# Patient Record
Sex: FEMALE | Race: OTHER | HISPANIC OR LATINO | URBAN - METROPOLITAN AREA
[De-identification: names, ages, dates, MRNs, and addresses within clinical notes are randomized per-mention and may not be internally consistent; named-entity substitution may affect disease eponyms.]

---

## 2024-02-09 ENCOUNTER — EMERGENCY (EMERGENCY)
Facility: HOSPITAL | Age: 24
LOS: 1 days | Discharge: ANOTHER HOSPITAL | End: 2024-02-09
Attending: STUDENT IN AN ORGANIZED HEALTH CARE EDUCATION/TRAINING PROGRAM
Payer: MEDICAID

## 2024-02-09 ENCOUNTER — INPATIENT (INPATIENT)
Facility: HOSPITAL | Age: 24
LOS: 3 days | Discharge: HOME | End: 2024-02-13
Attending: STUDENT IN AN ORGANIZED HEALTH CARE EDUCATION/TRAINING PROGRAM | Admitting: STUDENT IN AN ORGANIZED HEALTH CARE EDUCATION/TRAINING PROGRAM
Payer: MEDICAID

## 2024-02-09 VITALS — RESPIRATION RATE: 17 BRPM | HEIGHT: 63 IN | TEMPERATURE: 98 F | WEIGHT: 128.09 LBS

## 2024-02-09 VITALS
TEMPERATURE: 98 F | HEART RATE: 64 BPM | SYSTOLIC BLOOD PRESSURE: 92 MMHG | DIASTOLIC BLOOD PRESSURE: 53 MMHG | RESPIRATION RATE: 18 BRPM | OXYGEN SATURATION: 97 %

## 2024-02-09 VITALS
OXYGEN SATURATION: 97 % | WEIGHT: 134.48 LBS | RESPIRATION RATE: 18 BRPM | TEMPERATURE: 99 F | DIASTOLIC BLOOD PRESSURE: 79 MMHG | SYSTOLIC BLOOD PRESSURE: 114 MMHG | HEART RATE: 83 BPM

## 2024-02-09 DIAGNOSIS — F31.9 BIPOLAR DISORDER, UNSPECIFIED: ICD-10-CM

## 2024-02-09 DIAGNOSIS — F33.2 MAJOR DEPRESSIVE DISORDER, RECURRENT SEVERE WITHOUT PSYCHOTIC FEATURES: ICD-10-CM

## 2024-02-09 DIAGNOSIS — F29 UNSPECIFIED PSYCHOSIS NOT DUE TO A SUBSTANCE OR KNOWN PHYSIOLOGICAL CONDITION: ICD-10-CM

## 2024-02-09 DIAGNOSIS — F12.988 CANNABIS USE, UNSPECIFIED WITH OTHER CANNABIS-INDUCED DISORDER: ICD-10-CM

## 2024-02-09 LAB
ALBUMIN SERPL ELPH-MCNC: 3.4 G/DL — LOW (ref 3.5–5)
ALP SERPL-CCNC: 84 U/L — SIGNIFICANT CHANGE UP (ref 40–120)
ALT FLD-CCNC: 21 U/L DA — SIGNIFICANT CHANGE UP (ref 10–60)
AMPHET UR-MCNC: NEGATIVE — SIGNIFICANT CHANGE UP
ANION GAP SERPL CALC-SCNC: 6 MMOL/L — SIGNIFICANT CHANGE UP (ref 5–17)
APAP SERPL-MCNC: <2 UG/ML — LOW (ref 10–30)
APPEARANCE UR: CLEAR — SIGNIFICANT CHANGE UP
AST SERPL-CCNC: 17 U/L — SIGNIFICANT CHANGE UP (ref 10–40)
BACTERIA # UR AUTO: ABNORMAL /HPF
BARBITURATES UR SCN-MCNC: NEGATIVE — SIGNIFICANT CHANGE UP
BASOPHILS # BLD AUTO: 0.03 K/UL — SIGNIFICANT CHANGE UP (ref 0–0.2)
BASOPHILS NFR BLD AUTO: 0.4 % — SIGNIFICANT CHANGE UP (ref 0–2)
BENZODIAZ UR-MCNC: NEGATIVE — SIGNIFICANT CHANGE UP
BILIRUB SERPL-MCNC: 0.2 MG/DL — SIGNIFICANT CHANGE UP (ref 0.2–1.2)
BILIRUB UR-MCNC: NEGATIVE — SIGNIFICANT CHANGE UP
BUN SERPL-MCNC: 10 MG/DL — SIGNIFICANT CHANGE UP (ref 7–18)
CALCIUM SERPL-MCNC: 9.1 MG/DL — SIGNIFICANT CHANGE UP (ref 8.4–10.5)
CHLORIDE SERPL-SCNC: 107 MMOL/L — SIGNIFICANT CHANGE UP (ref 96–108)
CO2 SERPL-SCNC: 26 MMOL/L — SIGNIFICANT CHANGE UP (ref 22–31)
COCAINE METAB.OTHER UR-MCNC: NEGATIVE — SIGNIFICANT CHANGE UP
COLOR SPEC: YELLOW — SIGNIFICANT CHANGE UP
CREAT SERPL-MCNC: 0.69 MG/DL — SIGNIFICANT CHANGE UP (ref 0.5–1.3)
DIFF PNL FLD: ABNORMAL
EGFR: 127 ML/MIN/1.73M2 — SIGNIFICANT CHANGE UP
EOSINOPHIL # BLD AUTO: 0.19 K/UL — SIGNIFICANT CHANGE UP (ref 0–0.5)
EOSINOPHIL NFR BLD AUTO: 2.3 % — SIGNIFICANT CHANGE UP (ref 0–6)
EPI CELLS # UR: PRESENT
ETHANOL SERPL-MCNC: 6 MG/DL — SIGNIFICANT CHANGE UP (ref 0–10)
FLUAV AG NPH QL: SIGNIFICANT CHANGE UP
FLUBV AG NPH QL: SIGNIFICANT CHANGE UP
GLUCOSE SERPL-MCNC: 96 MG/DL — SIGNIFICANT CHANGE UP (ref 70–99)
GLUCOSE UR QL: NEGATIVE MG/DL — SIGNIFICANT CHANGE UP
HCT VFR BLD CALC: 41.1 % — SIGNIFICANT CHANGE UP (ref 34.5–45)
HGB BLD-MCNC: 13.4 G/DL — SIGNIFICANT CHANGE UP (ref 11.5–15.5)
IMM GRANULOCYTES NFR BLD AUTO: 0.5 % — SIGNIFICANT CHANGE UP (ref 0–0.9)
KETONES UR-MCNC: NEGATIVE MG/DL — SIGNIFICANT CHANGE UP
LEUKOCYTE ESTERASE UR-ACNC: NEGATIVE — SIGNIFICANT CHANGE UP
LYMPHOCYTES # BLD AUTO: 3.21 K/UL — SIGNIFICANT CHANGE UP (ref 1–3.3)
LYMPHOCYTES # BLD AUTO: 38 % — SIGNIFICANT CHANGE UP (ref 13–44)
MCHC RBC-ENTMCNC: 29.1 PG — SIGNIFICANT CHANGE UP (ref 27–34)
MCHC RBC-ENTMCNC: 32.6 GM/DL — SIGNIFICANT CHANGE UP (ref 32–36)
MCV RBC AUTO: 89.2 FL — SIGNIFICANT CHANGE UP (ref 80–100)
METHADONE UR-MCNC: NEGATIVE — SIGNIFICANT CHANGE UP
MONOCYTES # BLD AUTO: 0.62 K/UL — SIGNIFICANT CHANGE UP (ref 0–0.9)
MONOCYTES NFR BLD AUTO: 7.3 % — SIGNIFICANT CHANGE UP (ref 2–14)
NEUTROPHILS # BLD AUTO: 4.35 K/UL — SIGNIFICANT CHANGE UP (ref 1.8–7.4)
NEUTROPHILS NFR BLD AUTO: 51.5 % — SIGNIFICANT CHANGE UP (ref 43–77)
NITRITE UR-MCNC: NEGATIVE — SIGNIFICANT CHANGE UP
NRBC # BLD: 0 /100 WBCS — SIGNIFICANT CHANGE UP (ref 0–0)
OPIATES UR-MCNC: NEGATIVE — SIGNIFICANT CHANGE UP
PCP SPEC-MCNC: SIGNIFICANT CHANGE UP
PCP UR-MCNC: NEGATIVE — SIGNIFICANT CHANGE UP
PH UR: 7 — SIGNIFICANT CHANGE UP (ref 5–8)
PLATELET # BLD AUTO: 226 K/UL — SIGNIFICANT CHANGE UP (ref 150–400)
POTASSIUM SERPL-MCNC: 3.6 MMOL/L — SIGNIFICANT CHANGE UP (ref 3.5–5.3)
POTASSIUM SERPL-SCNC: 3.6 MMOL/L — SIGNIFICANT CHANGE UP (ref 3.5–5.3)
PROT SERPL-MCNC: 7.3 G/DL — SIGNIFICANT CHANGE UP (ref 6–8.3)
PROT UR-MCNC: NEGATIVE MG/DL — SIGNIFICANT CHANGE UP
RBC # BLD: 4.61 M/UL — SIGNIFICANT CHANGE UP (ref 3.8–5.2)
RBC # FLD: 13 % — SIGNIFICANT CHANGE UP (ref 10.3–14.5)
RBC CASTS # UR COMP ASSIST: 2 /HPF — SIGNIFICANT CHANGE UP (ref 0–4)
SALICYLATES SERPL-MCNC: <1.7 MG/DL — LOW (ref 2.8–20)
SARS-COV-2 RNA SPEC QL NAA+PROBE: SIGNIFICANT CHANGE UP
SODIUM SERPL-SCNC: 139 MMOL/L — SIGNIFICANT CHANGE UP (ref 135–145)
SP GR SPEC: 1.01 — SIGNIFICANT CHANGE UP (ref 1–1.03)
THC UR QL: POSITIVE
UROBILINOGEN FLD QL: 0.2 MG/DL — SIGNIFICANT CHANGE UP (ref 0.2–1)
WBC # BLD: 8.44 K/UL — SIGNIFICANT CHANGE UP (ref 3.8–10.5)
WBC # FLD AUTO: 8.44 K/UL — SIGNIFICANT CHANGE UP (ref 3.8–10.5)
WBC UR QL: 1 /HPF — SIGNIFICANT CHANGE UP (ref 0–5)

## 2024-02-09 PROCEDURE — 36415 COLL VENOUS BLD VENIPUNCTURE: CPT

## 2024-02-09 PROCEDURE — 81001 URINALYSIS AUTO W/SCOPE: CPT

## 2024-02-09 PROCEDURE — 87086 URINE CULTURE/COLONY COUNT: CPT

## 2024-02-09 PROCEDURE — 93010 ELECTROCARDIOGRAM REPORT: CPT

## 2024-02-09 PROCEDURE — 87637 SARSCOV2&INF A&B&RSV AMP PRB: CPT

## 2024-02-09 PROCEDURE — 90792 PSYCH DIAG EVAL W/MED SRVCS: CPT | Mod: 95,GC

## 2024-02-09 PROCEDURE — 99285 EMERGENCY DEPT VISIT HI MDM: CPT

## 2024-02-09 PROCEDURE — 93005 ELECTROCARDIOGRAM TRACING: CPT

## 2024-02-09 PROCEDURE — 80307 DRUG TEST PRSMV CHEM ANLYZR: CPT

## 2024-02-09 PROCEDURE — 80053 COMPREHEN METABOLIC PANEL: CPT

## 2024-02-09 PROCEDURE — 85025 COMPLETE CBC W/AUTO DIFF WBC: CPT

## 2024-02-09 RX ORDER — MAGNESIUM HYDROXIDE 400 MG/1
30 TABLET, CHEWABLE ORAL DAILY
Refills: 0 | Status: DISCONTINUED | OUTPATIENT
Start: 2024-02-09 | End: 2024-02-13

## 2024-02-09 RX ORDER — OLANZAPINE 15 MG/1
5 TABLET, FILM COATED ORAL ONCE
Refills: 0 | Status: DISCONTINUED | OUTPATIENT
Start: 2024-02-09 | End: 2024-02-13

## 2024-02-09 RX ORDER — OLANZAPINE 15 MG/1
5 TABLET, FILM COATED ORAL EVERY 6 HOURS
Refills: 0 | Status: DISCONTINUED | OUTPATIENT
Start: 2024-02-09 | End: 2024-02-09

## 2024-02-09 RX ORDER — HYDROXYZINE HCL 10 MG
50 TABLET ORAL AT BEDTIME
Refills: 0 | Status: DISCONTINUED | OUTPATIENT
Start: 2024-02-09 | End: 2024-02-13

## 2024-02-09 RX ORDER — ACETAMINOPHEN 500 MG
650 TABLET ORAL EVERY 6 HOURS
Refills: 0 | Status: DISCONTINUED | OUTPATIENT
Start: 2024-02-09 | End: 2024-02-13

## 2024-02-09 RX ORDER — OLANZAPINE 15 MG/1
5 TABLET, FILM COATED ORAL EVERY 6 HOURS
Refills: 0 | Status: DISCONTINUED | OUTPATIENT
Start: 2024-02-09 | End: 2024-02-13

## 2024-02-09 RX ORDER — OLANZAPINE 15 MG/1
5 TABLET, FILM COATED ORAL ONCE
Refills: 0 | Status: COMPLETED | OUTPATIENT
Start: 2024-02-09 | End: 2024-02-09

## 2024-02-09 RX ADMIN — OLANZAPINE 5 MILLIGRAM(S): 15 TABLET, FILM COATED ORAL at 12:19

## 2024-02-09 NOTE — BH INPATIENT PSYCHIATRY ASSESSMENT NOTE - NSBHATTESTCOMMENTATTENDFT_PSY_A_CORE
Patient also seen by Attending. Rule out substance induced psychosis at this time. MSE should improve as substance leaves the system. Suspecting reported use amounts and frequency may be higher of edibles.

## 2024-02-09 NOTE — ED ADULT NURSE NOTE - OBJECTIVE STATEMENT
the  patient  is a 23 y female complaining of anxiety  accompanied by parent the  patient  is a 23 y female complaining of anxiety  accompanied by parent.1:1 constant observation started by DR. UPTON

## 2024-02-09 NOTE — BH INPATIENT PSYCHIATRY ASSESSMENT NOTE - NSBHCHARTREVIEWLAB_PSY_A_CORE FT
LABS:                        13.4   8.44  )-----------( 226      ( 09 Feb 2024 05:40 )             41.1     02-09    139  |  107  |  10  ----------------------------<  96  3.6   |  26  |  0.69    Ca    9.1      09 Feb 2024 05:40    TPro  7.3  /  Alb  3.4<L>  /  TBili  0.2  /  DBili  x   /  AST  17  /  ALT  21  /  AlkPhos  84  02-09

## 2024-02-09 NOTE — ED ADULT NURSE REASSESSMENT NOTE - NS ED NURSE REASSESS COMMENT FT1
7 20 am pt sleeping now , no  distress . 1:1 constant  observation in progress  . report given  to day shift RN

## 2024-02-09 NOTE — ED ADULT NURSE NOTE - NSFALLUNIVINTERV_ED_ALL_ED
Bed/Stretcher in lowest position, wheels locked, appropriate side rails in place/Call bell, personal items and telephone in reach/Instruct patient to call for assistance before getting out of bed/chair/stretcher/Non-slip footwear applied when patient is off stretcher/[place] to call system/Physically safe environment - no spills, clutter or unnecessary equipment/Purposeful proactive rounding/Room/bathroom lighting operational, light cord in reach

## 2024-02-09 NOTE — ED PROVIDER NOTE - CLINICAL SUMMARY MEDICAL DECISION MAKING FREE TEXT BOX
23-year-old female hx of bipolar disorder not on meds presenting with anxiety since yesterday. Labs ok, drug screen positive for THC. Medically cleared, Psych paged for recs.

## 2024-02-09 NOTE — ED BEHAVIORAL HEALTH ASSESSMENT NOTE - DIFFERENTIAL
Substance induced mood disorder vs substance induced psychosis vs bipolar disorder Psychosis, unspecified vs Substance induced mood disorder vs substance induced psychosis vs bipolar disorder vs unspecified mood disorder

## 2024-02-09 NOTE — ED BEHAVIORAL HEALTH ASSESSMENT NOTE - DESCRIPTION
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

## 2024-02-09 NOTE — ED BEHAVIORAL HEALTH ASSESSMENT NOTE - OTHER PAST PSYCHIATRIC HISTORY (INCLUDE DETAILS REGARDING ONSET, COURSE OF ILLNESS, INPATIENT/OUTPATIENT TREATMENT)
[facility] stay in 2018 for SI, diagnosed with Bipolar Disorder, put on Abilify and Lexapro, stopped all meds/follow up 2-5 months after discharge from [facility]

## 2024-02-09 NOTE — ED PROVIDER NOTE - PROGRESS NOTE DETAILS
Arely WHITMORE: Patient signed out to me.  23-year-old female history of bipolar disorder, previously on aripiprazole discontinued 1 year ago (self dc'd).  Patient presenting with anxiety.  She had an edible 2 days ago and she was seen at [facility] for anxiety, was medically cleared and discharge.  Patient presenting once again with anxiety.  Patient awaiting telepsych evaluation and recs.   No SI, AH, or HI.  Patient on one-to-one.  +THC. Arely WHITMORE: Notified by telepsych that patient will be admitted on a voluntary basis.  Recommendations to give olanzapine 5 mg p.o. x 1 dose here.  Patient awaiting bed placement.  Will fill out voluntary forms and placed in chart.

## 2024-02-09 NOTE — ED BEHAVIORAL HEALTH NOTE - BEHAVIORAL HEALTH NOTE
========================      FOR EACH COLLATERAL      ========================      Collateral below has requested that the information provided remain confidential: Yes [  ] No [ x]      Collateral below has provided information that patient is/may be unaware of: Yes [  ] No [x ]      Patient gives permission to obtain collateral from _____:       ( ) Yes      ( x)  No      Rationale for overriding objection                (  ) Lack of capacity. Details: ________                ( x) Assessing risk of danger to self/others. Details: ________      Rationale for selecting specific collateral source                ( ) Potential to impact risk of danger to self/others and no alternative equivalent. Details:            NAME:  Connor Cox     NUMBER:  [phone]     RELATIONSHIP:  Mother     RELIABILITY: Somewhat Reliable     COMMENTS: A Setswana speaking  was used to conduct this interview ID number is #: [id number] and #[id number]. Collateral believes admission will be helpful for the pt because she is fearful that something might happen if the pt is discharged.      ========================      PATIENT DEMOGRAPHICS:        ========================      HPI      BASELINE FUNCTIONING: The pt is a 23-year-old female, a student at [employer], employed as a  at a restaurant and domiciles in [place] with her boyfriend. Per collateral, at baseline functioning, the pt is typically a happy person, joyful, talkative, and sociable. Per collateral, the pt has a normal sleep pattern, maintains her personal hygiene, eats regularly and engages in routine activities such as going to work. Per collateral, the pt daily attends work and was last at work on Wednesday February 7, 2024.       DATE HPI STARTED:  Yesterday     DECOMPENSATION: Per collateral, she last saw the pt on Wednesday where she was presenting like her usual self, she ate dinner and left for her boyfriend’s home. Per collateral, on Thursday (February 9, 2024) she received a call from the pt’s boyfriend where she was told that the pt was not “feeling ok”, was complaining of chest pain, refusing to sleep in her bed and stating that she was afraid. Per collateral, she then spoke with the pt who told her that she was afraid she needed help which led to her taking the pt to the ED at midnight. When asked why the pt was afraid, the collateral reported that she does not know, however, the pt presented similarly 6 years ago when the pt was living with her dad and experiencing loneliness. Collateral also believes the pt is depressed due to similar presentations of collateral (when collateral was experiencing depression).     SUICIDALITY: Per collateral, the pt has not endorsed any SI, SA or HI. However, collateral reported that the pt reported “hearing something yesterday”. Collateral is unclear whether the pt was experiencing AH and was unable to state what the pt reported hearing.       VIOLENCE: Per collateral, the pt has not been violent recently.     SUBSTANCE: Per collateral, the pt has not engaged in substance use recently.      ========================      PAST PSYCHIATRIC HISTORY      ========================      DATE PAST PSYCHIATRIC HISTORY STARTED: Per, collateral, the pt’s pphx started when she was 18 years old.      MAIN PSYCHIATRIC DIAGNOSIS: Per collateral, the pt was dx with bipolar dx.     PSYCHIATRIC HOSPITALIZATIONS: Per collateral, the pt has no pph.     PRIOR ILLNESS: Per collateral, the pt used to have a psychiatrist and therapist that she last seen 2-6 months ago due to SI. Collateral denies having contact information for pt.     SUICIDALITY: Per collateral, the pt endorsed SI 6 years ago. Collateral reported that the pt stated she did not want to live anymore. Per collateral, pt has no hx of SA, HI or AVH.     VIOLENCE: Per collateral, the pt has no hx of violence.     SUBSTANCE USE: Per collateral, the pt has no hx of substance use.      ==============      OTHER HISTORY      ==============      CURRENT MEDICATION: Per collateral, the pt currently does not take any medication, however, in the past the pt was taking Aripiprazole. Dosage and duration are unknown.     MEDICAL HISTORY: Per collateral, the pt has no medical hx.     ALLERGIES: Per collateral, the pt has no allergies.     LEGAL ISSUES: Per collateral, the pt has no legal hx.     FIREARM ACCESS: Per collateral, the pt has no access to firearms or lethal weapons.     SOCIAL HISTORY: Per collateral, the pt may have been traumatized by her and the pt’s father’s divorce.     FAMILY HISTORY: Per collateral, she has a hx of bipolar and depression. However, the pt has no other family psych hx.     DEVELOPMENTAL HISTORY: Per collateral, the pt has no developmental hx.     -----------------------------------------------     COVID Exposure Screen- collateral (i.e. third-party, chart review, belongings, etc; include EMS and ED staff     ==============     Has the patient been tested for COVID-19 in the last 90 days?  (  ) Yes   (  ) No   ( x ) Unknown- Reason: _____     IF YES: Date of test(s), type of test(s), result(s) for ALL tests in last 90 days: ________     In the past 10 days, has the patient been around anyone with a positive COVID-19 test? (  ) Yes   (  x) No   (  ) Unknown- Reason: ____     IF YES: Was the patient closer than 6 feet of them for a total of 15 minutes or more in a 24 hour period? (  ) Yes   (  ) No   (  ) Unknown- Reason: _____          ========================      FOR EACH COLLATERAL      ========================      Collateral below has requested that the information provided remain confidential: Yes [  ] No [x ]      Collateral below has provided information that patient is/may be unaware of: Yes [  ] No [x ]      Patient gives permission to obtain collateral from _____:       ( x) Yes      ( )  No      Rationale for overriding objection                (  ) Lack of capacity. Details: ________                ( x) Assessing risk of danger to self/others. Details: ________      Rationale for selecting specific collateral source                ( ) Potential to impact risk of danger to self/others and no alternative equivalent. Details:            NAME:  Jonny     NUMBER:  [phone]     RELATIONSHIP:  Boyfriend     RELIABILITY:  Reliable     COMMENTS: Collateral desires for the pt to be discharge, however, he wants what is best for the pt. Collateral also believes that the pt desires admission but is fearful of being placed on a psych unit.        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 Per collateral, at baseline functioning, the pt is “perfectly fine”, goes to work daily—last went to work Wednesday February 7, 2024, sociable and enjoys going on date with him (collateral). Per collateral, the pt has a normal sleep pattern, maintains her personal hygiene, eats regularly and engages in routine activities such as going to work, cooking meals together (with collateral), visiting family members (on both sides) and watching TV.     DATE HPI STARTED: Wednesday night— February 7, 2024.     DECOMPENSATION: Per collateral, on Wednesday night, he picked up the pt at her job after her shift ended and then headed to the pt’s mother house to have dinner. Per collateral, on the way to the pt’s mother’s house, they saw a smoke shop and shopped to purchase “gummies”—primarily, “marijuana gummies”. Upon purchasing the gummies, the collateral reported that he and the pt continued to the pt’s mother’s house where they had dinner, then left, went to the mall, then to their apartment. Collateral reported that at around 8:30 pm, he and the pt both ate the gummies at the same time (amount is unknown), then about 15-20 minutes after, the collateral reported that the pt began having panic attacks—breathing heavily, with increased heart rate that was evident in the movement of the pt’s chest. Collateral also reported that the pt was also moving her arms and feet “irradicably” and began making complaints that she was not feeling well. Per collateral, the pt continued to complain that she was not feeling well, so he called the ambulance, who then took the pt to a “hospital around [place]”—collateral was unable to name the hospital. Per collateral, when the pt arrived at the hospital they (nurse) checked her vitals, blood, body temperature and gave the pt “IV”—collateral was unable to state what the IV was for. Per collateral, shortly thereafter, the pt fell asleep and was “in and out” but was asleep for most of the night. Per collateral, Thursday morning (February 8, 2024) at 7 am, the pt was discharged from the hospital and appeared to be doing much better than when she first came to the ED. However, collateral reported that the pt was much quieter. Collateral reported that after they left the hospital, they took an uber back to their apartment, where the pt stated that she was hungry and they ordered food, ate, watched TV then took a nap. Per collateral, after the pt woke from her nap, the pt was “acting weird”, she was still very quiet, her eyes “seemed distant, as if she was looking at me, but not at me”. Collateral reported that he told the pt to go back to sleep, but she refused, stating that she “felt like she was sleeping for a while”. Per collateral, later in the night, the pt began stressing and crying, so he called the pt’s mother, who later came to their apartment, made some food which the pt ate a little. Collateral reported that the pt asked her mother to help her shower, and while she was taking a shower, she began crying and told her mother she needed help. Per collateral, the pt mother then took the pt to [facility] ED.     SUICIDALITY: Per collateral, the pt has not endorsed any SI, SA, HI or AVH recently.     VIOLENCE:   Per collateral, the pt has not been violent recently     SUBSTANCE: Per collateral, prior to Wednesday night, the pt has not engaged in substance use—Wednesday was the pt’s first time.     ========================      PAST PSYCHIATRIC HISTORY      ========================      DATE PAST PSYCHIATRIC HISTORY STARTED:  Unknown     MAIN PSYCHIATRIC DIAGNOSIS: Per collateral, he was told that the pt was dx with bipolar d/o.     PSYCHIATRIC HOSPITALIZATIONS: Per collateral, the pt was hospitalized when she was 18 years old, but collateral could not provide further details.     PRIOR ILLNESS: Per collateral, the pt engages in therapy every Monday “around 12 pm” with her therapist “Pablo”—# [phone] both virtually and in-person.  collateral reported that the pt did not attend her therapy session Monday (February 5, 2024) due to clashes with other appointments.     SUICIDALITY: Per collateral, the pt has no hx of SI, SA, HI or AVH.     VIOLENCE: Per collateral, the pt has no hx of violence.     SUBSTANCE USE: Per collateral, the pt has no hx of substance use.     ==============      OTHER HISTORY      ==============      CURRENT MEDICATION: Per collateral, the pt does not currently take any medication. However, collateral reported that in the past the pt took medication for bipolar (name unknown) but has stopped taking the medication for almost a year.      MEDICAL HISTORY: Per collateral, the pt has no current medical issue, but had anemia in the past that “went away few months ago because she was taking supplements”.      ALLERGIES: Per collateral, the pt is allergic to pineapple.     LEGAL ISSUES: Per collateral, the pt has no legal hx.     FIREARM ACCESS: Per collateral, the pt has no access to firearms or lethal weapons.     SOCIAL HISTORY: Per collateral, the pt has no hx of trauma.     FAMILY HISTORY: Per collateral, the pt’s mother has a hx of schizophrenia.     DEVELOPMENTAL HISTORY: Per collateral, the pt has no developmental hx.     -----------------------------------------------          COVID Exposure Screen- collateral (i.e. third-party, chart review, belongings, etc; include EMS and ED staff     ==============     Has the patient been tested for COVID-19 in the last 90 days?  (  ) Yes   (  ) No   (  x) Unknown- Reason: _____     IF YES: Date of test(s), type of test(s), result(s) for ALL tests in last 90 days: ________     In the past 10 days, has the patient been around anyone with a positive COVID-19 test? (  ) Yes   (  ) No   ( x ) Unknown- Reason: ____     IF YES: Was the patient closer than 6 feet of them for a total of 15 minutes or more in a 24 hour period? (  ) Yes   (  ) No   (  ) Unknown- Reason: _____ ========================      FOR EACH COLLATERAL      ========================      Collateral below has requested that the information provided remain confidential: Yes [  ] No [ x]      Collateral below has provided information that patient is/may be unaware of: Yes [  ] No [x ]      Patient gives permission to obtain collateral from _____:       ( ) Yes      ( x)  No      Rationale for overriding objection                (  ) Lack of capacity. Details: ________                ( x) Assessing risk of danger to self/others. Details: ________      Rationale for selecting specific collateral source                ( ) Potential to impact risk of danger to self/others and no alternative equivalent. Details:            NAME:  Connor Cox     NUMBER:  [phone]     RELATIONSHIP:  Mother     RELIABILITY: Somewhat Reliable     COMMENTS: A Czech speaking  was used to conduct this interview ID number is #: [id number] and [id number]. Collateral believes admission will be helpful for the pt because she is fearful that something might happen if the pt is discharged.      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 Per collateral, at baseline functioning, the pt is typically a happy person, joyful, talkative, and sociable. Per collateral, the pt has a normal sleep pattern, maintains her personal hygiene, eats regularly and engages in routine activities such as going to work. Per collateral, the pt daily attends work and was last at work on Wednesday February 7, 2024.       DATE HPI STARTED:  Yesterday     DECOMPENSATION: Per collateral, she last saw the pt on Wednesday where she was presenting like her usual self, she ate dinner and left for her boyfriend’s home. Per collateral, on Thursday (February 9, 2024) she received a call from the pt’s boyfriend where she was told that the pt was not “feeling ok”, was complaining of chest pain, refusing to sleep in her bed and stating that she was afraid. Per collateral, she then spoke with the pt who told her that she was afraid she needed help which led to her taking the pt to the ED at midnight. When asked why the pt was afraid, the collateral reported that she does not know, however, the pt presented similarly 6 years ago when the pt was living with her dad and experiencing loneliness. Collateral also believes the pt is depressed due to similar presentations of collateral (when collateral was experiencing depression).     SUICIDALITY: Per collateral, the pt has not endorsed any SI, SA or HI. However, collateral reported that the pt reported “hearing something yesterday”. Collateral is unclear whether the pt was experiencing AH and was unable to state what the pt reported hearing.       VIOLENCE: Per collateral, the pt has not been violent recently.     SUBSTANCE: Per collateral, the pt has not engaged in substance use recently.      ========================      PAST PSYCHIATRIC HISTORY      ========================      DATE PAST PSYCHIATRIC HISTORY STARTED: Per, collateral, the pt’s pphx started when she was 18 years old.      MAIN PSYCHIATRIC DIAGNOSIS: Per collateral, the pt was dx with bipolar dx.     PSYCHIATRIC HOSPITALIZATIONS: Per collateral, the pt has no pph.     PRIOR ILLNESS: Per collateral, the pt used to have a psychiatrist and therapist that she last seen 2-6 months ago due to SI. Collateral denies having contact information for pt.     SUICIDALITY: Per collateral, the pt endorsed SI 6 years ago. Collateral reported that the pt stated she did not want to live anymore. Per collateral, pt has no hx of SA, HI or AVH.     VIOLENCE: Per collateral, the pt has no hx of violence.     SUBSTANCE USE: Per collateral, the pt has no hx of substance use.      ==============      OTHER HISTORY      ==============      CURRENT MEDICATION: Per collateral, the pt currently does not take any medication, however, in the past the pt was taking Aripiprazole. Dosage and duration are unknown.     MEDICAL HISTORY: Per collateral, the pt has no medical hx.     ALLERGIES: Per collateral, the pt has no allergies.     LEGAL ISSUES: Per collateral, the pt has no legal hx.     FIREARM ACCESS: Per collateral, the pt has no access to firearms or lethal weapons.     SOCIAL HISTORY: Per collateral, the pt may have been traumatized by her and the pt’s father’s divorce.     FAMILY HISTORY: Per collateral, she has a hx of bipolar and depression. However, the pt has no other family psych hx.     DEVELOPMENTAL HISTORY: Per collateral, the pt has no developmental hx.     -----------------------------------------------     COVID Exposure Screen- collateral (i.e. third-party, chart review, belongings, etc; include EMS and ED staff     ==============     Has the patient been tested for COVID-19 in the last 90 days?  (  ) Yes   (  ) No   ( x ) Unknown- Reason: _____     IF YES: Date of test(s), type of test(s), result(s) for ALL tests in last 90 days: ________     In the past 10 days, has the patient been around anyone with a positive COVID-19 test? (  ) Yes   (  x) No   (  ) Unknown- Reason: ____     IF YES: Was the patient closer than 6 feet of them for a total of 15 minutes or more in a 24 hour period? (  ) Yes   (  ) No   (  ) Unknown- Reason: _____          ========================      FOR EACH COLLATERAL      ========================      Collateral below has requested that the information provided remain confidential: Yes [  ] No [x ]      Collateral below has provided information that patient is/may be unaware of: Yes [  ] No [x ]      Patient gives permission to obtain collateral from _____:       ( x) Yes      ( )  No      Rationale for overriding objection                (  ) Lack of capacity. Details: ________                ( x) Assessing risk of danger to self/others. Details: ________      Rationale for selecting specific collateral source                ( ) Potential to impact risk of danger to self/others and no alternative equivalent. Details:            NAME:  Jonny     NUMBER:  [phone]     RELATIONSHIP:  Boyfriend     RELIABILITY:  Reliable     COMMENTS: Collateral desires for the pt to be discharge, however, he wants what is best for the pt. Collateral also believes that the pt desires admission but is fearful of being placed on a psych unit.        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 Per collateral, at baseline functioning, the pt is “perfectly fine”, goes to work daily—last went to work Wednesday February 7, 2024, sociable and enjoys going on date with him (collateral). Per collateral, the pt has a normal sleep pattern, maintains her personal hygiene, eats regularly and engages in routine activities such as going to work, cooking meals together (with collateral), visiting family members (on both sides) and watching TV.     DATE HPI STARTED: Wednesday night— February 7, 2024.     DECOMPENSATION: Per collateral, on Wednesday night, he picked up the pt at her job after her shift ended and then headed to the pt’s mother house to have dinner. Per collateral, on the way to the pt’s mother’s house, they saw a smoke shop and shopped to purchase “gummies”—primarily, “marijuana gummies”. Upon purchasing the gummies, the collateral reported that he and the pt continued to the pt’s mother’s house where they had dinner, then left, went to the mall, then to their apartment. Collateral reported that at around 8:30 pm, he and the pt both ate the gummies at the same time (amount is unknown), then about 15-20 minutes after, the collateral reported that the pt began having panic attacks—breathing heavily, with increased heart rate that was evident in the movement of the pt’s chest. Collateral also reported that the pt was also moving her arms and feet “irradicably” and began making complaints that she was not feeling well. Per collateral, the pt continued to complain that she was not feeling well, so he called the ambulance, who then took the pt to a “hospital around [place]”—collateral was unable to name the hospital. Per collateral, when the pt arrived at the hospital they (nurse) checked her vitals, blood, body temperature and gave the pt “IV”—collateral was unable to state what the IV was for. Per collateral, shortly thereafter, the pt fell asleep and was “in and out” but was asleep for most of the night. Per collateral, Thursday morning (February 8, 2024) at 7 am, the pt was discharged from the hospital and appeared to be doing much better than when she first came to the ED. However, collateral reported that the pt was much quieter. Collateral reported that after they left the hospital, they took an uber back to their apartment, where the pt stated that she was hungry and they ordered food, ate, watched TV then took a nap. Per collateral, after the pt woke from her nap, the pt was “acting weird”, she was still very quiet, her eyes “seemed distant, as if she was looking at me, but not at me”. Collateral reported that he told the pt to go back to sleep, but she refused, stating that she “felt like she was sleeping for a while”. Per collateral, later in the night, the pt began stressing and crying, so he called the pt’s mother, who later came to their apartment, made some food which the pt ate a little. Collateral reported that the pt asked her mother to help her shower, and while she was taking a shower, she began crying and told her mother she needed help. Per collateral, the pt mother then took the pt to [facility] ED.     SUICIDALITY: Per collateral, the pt has not endorsed any SI, SA, HI or AVH recently.     VIOLENCE:   Per collateral, the pt has not been violent recently     SUBSTANCE: Per collateral, prior to Wednesday night, the pt has not engaged in substance use—Wednesday was the pt’s first time.     ========================      PAST PSYCHIATRIC HISTORY      ========================      DATE PAST PSYCHIATRIC HISTORY STARTED:  Unknown     MAIN PSYCHIATRIC DIAGNOSIS: Per collateral, he was told that the pt was dx with bipolar d/o.     PSYCHIATRIC HOSPITALIZATIONS: Per collateral, the pt was hospitalized when she was 18 years old, but collateral could not provide further details.     PRIOR ILLNESS: Per collateral, the pt engages in therapy every Monday “around 12 pm” with her therapist “Pablo”—# [phone] both virtually and in-person.  collateral reported that the pt did not attend her therapy session Monday (February 5, 2024) due to clashes with other appointments.     SUICIDALITY: Per collateral, the pt has no hx of SI, SA, HI or AVH.     VIOLENCE: Per collateral, the pt has no hx of violence.     SUBSTANCE USE: Per collateral, the pt has no hx of substance use.     ==============      OTHER HISTORY      ==============      CURRENT MEDICATION: Per collateral, the pt does not currently take any medication. However, collateral reported that in the past the pt took medication for bipolar (name unknown) but has stopped taking the medication for almost a year.      MEDICAL HISTORY: Per collateral, the pt has no current medical issue, but had anemia in the past that “went away few months ago because she was taking supplements”.      ALLERGIES: Per collateral, the pt is allergic to pineapple.     LEGAL ISSUES: Per collateral, the pt has no legal hx.     FIREARM ACCESS: Per collateral, the pt has no access to firearms or lethal weapons.     SOCIAL HISTORY: Per collateral, the pt has no hx of trauma.     FAMILY HISTORY: Per collateral, the pt’s mother has a hx of schizophrenia.     DEVELOPMENTAL HISTORY: Per collateral, the pt has no developmental hx.     -----------------------------------------------          COVID Exposure Screen- collateral (i.e. third-party, chart review, belongings, etc; include EMS and ED staff     ==============     Has the patient been tested for COVID-19 in the last 90 days?  (  ) Yes   (  ) No   (  x) Unknown- Reason: _____     IF YES: Date of test(s), type of test(s), result(s) for ALL tests in last 90 days: ________     In the past 10 days, has the patient been around anyone with a positive COVID-19 test? (  ) Yes   (  ) No   ( x ) Unknown- Reason: ____     IF YES: Was the patient closer than 6 feet of them for a total of 15 minutes or more in a 24 hour period? (  ) Yes   (  ) No   (  ) Unknown- Reason: _____

## 2024-02-09 NOTE — BH INPATIENT PSYCHIATRY ASSESSMENT NOTE - DESCRIPTION
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

## 2024-02-09 NOTE — ED BEHAVIORAL HEALTH ASSESSMENT NOTE - NSBHATTESTCOMMENTATTENDFT_PSY_A_CORE
Patient seen and examined with Dr. Nelson. Chart reviewed. Case discussed with EM attending. Patient is presenting with >30 hours of mood lability, erratic sleep and behavior, per mother and boyfriend with whom she lives not at her baseline. Her thought process is non-linear, tangential and circumstantial. She reports significant distress and anxiety. Admits to using THC gummis on Wednesday evening. Her mother and boyfriend are concerned for her safety at this time. Given that patient's symptoms have not resolved since Wednesday night, as well as her significant past psychiatric history, she would likely benefit from psychiatric hospitalization for safety, stabilization and appropriate aftercare planning. Will offer olanzapine 5 mg PO now to target disorganized TP. Requested Covid test for patient at 10:15 AM. Agree with assessment and plan as detailed by Dr. Nelson.

## 2024-02-09 NOTE — BH INPATIENT PSYCHIATRY ASSESSMENT NOTE - RISK ASSESSMENT
Pt currently at low risk of suicide or self harm. Risk factors include one previous suicide attempt via overdose, one prior psychiatric admission, prior diagnosis of depression, current affective dysregulation. Protective factors include lack of SI, stable relationship with boyfriend, stable housing and engagement in school, help seeking behavior and future orientation.    Pt currently at low acute violence risk. Risk factors include current affective dysregulation, substance use, sleep dysregulation. Protective factors include lack of history of violence, lack of hx of legal issues, lack of current HI, residential stability, engagement in school.

## 2024-02-09 NOTE — ED BEHAVIORAL HEALTH ASSESSMENT NOTE - RISK ASSESSMENT
Warning signs: isolation, anxiety  Static risk factors: history of psychiatric hospitalizations  Modifiable risk factors:  psychiatric illness, recent psychosocial stressors, medical illness/pain, substance use/intoxication, lack of connection to care  Protective factors: social support, family connectiveness, seeking out treatment/hopefulness, no past SA, no past NSSIB, no firearm in residence, domiciled status, future-orientation, lack of current suicidality or homicidally, lack of urges to self-harm or engage in NSSIB, identifies various reasons for living  Access to lethal: denies  Level of risk: low

## 2024-02-09 NOTE — BH INPATIENT PSYCHIATRY ASSESSMENT NOTE - CURRENT MEDICATION
MEDICATIONS  (STANDING):  OLANZapine 5 milliGRAM(s) Oral every 6 hours    MEDICATIONS  (PRN):  acetaminophen     Tablet .. 650 milliGRAM(s) Oral every 6 hours PRN Temp greater or equal to 38C (100.4F), Mild Pain (1 - 3)  aluminum hydroxide/magnesium hydroxide/simethicone Suspension 30 milliLiter(s) Oral every 6 hours PRN Dyspepsia  hydrOXYzine hydrochloride 50 milliGRAM(s) Oral at bedtime PRN insomnia  LORazepam     Tablet 1 milliGRAM(s) Oral every 6 hours PRN Anxiety  magnesium hydroxide Suspension 30 milliLiter(s) Oral daily PRN Constipation  OLANZapine Injectable 5 milliGRAM(s) IntraMuscular once PRN severe agitation

## 2024-02-09 NOTE — ED ADULT TRIAGE NOTE - CHIEF COMPLAINT QUOTE
I feel anxious.  Patient was evaluated and discharged from another hospital last night.  She does not remember the event.  She also states she does not recall getting to this hospital [facility].  Patient is accompanied by parent.

## 2024-02-09 NOTE — ED BEHAVIORAL HEALTH ASSESSMENT NOTE - HPI (INCLUDE ILLNESS QUALITY, SEVERITY, DURATION, TIMING, CONTEXT, MODIFYING FACTORS, ASSOCIATED SIGNS AND SYMPTOMS)
Patient is a 23 year old female, single, no dependents, college student, works as , domiciled with boyfriend, with no relevant past medical history, with past psychiatric history of bipolar disorder, not currently in care or treatment, one prior IPP admission in 2018 for SI, no prior SA, substance use of cannabis, ED visit yesterday for same complaint,  presented to the ED with family after consuming an edible, and experiencing increased confusion, anxiety, and disorganization.     Upon evaluation, patient appears labile, acutely distressed, child-like at time, and mainly non-linear. She is organized with preserved attention. She is a poor historian, saying "I don't know" and throwing her head back into her pillow, crying, at most questions. She admits to taking a 100mg gummy, bought from a store, either on 2/7 or 2/8. Since then she "remembers being in and out." She is unable to recall details of last 2 days, but does endorse anxiety, increased sleep, not feeling hungry despite stomach growls, low energy, and anhedonia.  Patient denies any current passive or active suicidal ideation, intent or plan and denies any homicidal ideation. Patient denies any persecutory delusions and denies any auditory or visual hallucinations. Denies any feelings hopelessness. She mentions a history of Bipolar Disorder, last manic episode was years ago when she was: wasting a lot of money, got admitted to IPP, and "hated it." She explains that she was hesitant to come in today because she was worried she'd be readmitted. Of note, she mentions taking 1 Alleve a day for pain secondary to contraceptive implant and theraflu (has felt sick). Patient is amenable to inpatient admission, asking "Can you make sure everything is ok?"     See  note for full collateral, per boyfriend, patient baseline was Wednesday, 2/7, at 8:30pm. Patient took THC gummy, bought from shop, on 2/7 at 8:30PM. Within 15 minutes she was having a "panic attack," moving erratic, increased HR and RR, which prompted him to call an ambulance. In ED, patient was able to rest, and was eventually discharged home at 7am on 2/8. Once at home she acted "distant", quiet, and slept for "a long time." Later, mother came over after boyfriend asked for help; she showered patient, cooked dinner for them, and patient admitted to her that she "needed help," prompting her most recent presentation.

## 2024-02-09 NOTE — BH INPATIENT PSYCHIATRY ASSESSMENT NOTE - MSE UNSTRUCTURED FT
The patient appears stated age, fair hygiene, dressed appropriately. She was calm, cooperative with the interview. She avoided eye contact. No psychomotor agitation or retardation. Steady gait. The patient's speech was hypophonic, difficult to understand. The patient's mood is "not good." Affect is constricted, anxious, consistent with stated mood. Patient is childlike, guarded but denying paranoid delusions. Well-related. The patient's thoughts are goal directed. Denies hallucinations but states vision is "blurry". She denies any suicidal or homicidal ideation, intent, or plan. Insight is fair. Judgement is impaired. Impulse control has been fair on the unit.

## 2024-02-09 NOTE — BH INPATIENT PSYCHIATRY ASSESSMENT NOTE - NSBHASSESSSUMMFT_PSY_ALL_CORE
23 F w/ PPHx depression, one previous overdose and IPU 5 years ago for passive SI, presenting with anxiety and perceptual disturbances two days after edible ingestion. Given onset of sxs of visual changes and anxiety immediately after edible ingestion, most likely diagnosis is substance induced mood disorder. Currently, pt states visual disturbances are improving and she is mostly afraid that they will come back. Does not appear acutely psychotic or manic, although diagnosis remains unclear I/s/o recent substance use and sleep disturbances. Patient currently is affectively dysregulated, mildly paranoid and guarded, sxs are impairing her ability to care for self and complete ADLs. Continued inpatient admission required for stabilization, medication management and linkage to outpatient care.       Plan:  1. Legal: Admitted on/continue 9.13 status  2. Safety: No reported SI/SIB/HI/VI currently on unit; continue routine observation.  -Haldol/Ativan PRN medications for safety/agitation  3. Psychiatric:  - no standing psychiatric medications at this time as there are lack of definable target symptoms, monitor off medications to see if visual disturbances and anxiety self resolve.   4. Therapy: group and milieu therapy  5. Medical: No acute medical needs identified  6. Collateral: Collateral pending from mother and boyfriend  7. Disposition: Pending clinical improvement, likely to home

## 2024-02-09 NOTE — ED ADULT NURSE NOTE - CHIEF COMPLAINT QUOTE
I feel anxious.  Patient was evaluated and discharged from another hospital last night.  She does not remember the event.  She also states she does not recall getting to this hospital [facility].  Patient is accompanied by parent.

## 2024-02-09 NOTE — ED PROVIDER NOTE - OBJECTIVE STATEMENT
23-year-old female hx of bipolar disorder not on meds presenting with anxiety since yesterday. She was previously on aripiprazole for bipolar but self discontinued one year ago. Yesterday she took an edible and had a panic attack, went to [facility] where she was monitored and discharged until morning when she felt better. During the day, she felt intermittently confused and "out of it," does not remember large stretches of the day, and feels anxious. Denies SI, HI, AH, VH. No other symptoms.

## 2024-02-09 NOTE — BH INPATIENT PSYCHIATRY ASSESSMENT NOTE - NSBHCHARTREVIEWVS_PSY_A_CORE FT
Vital Signs Last 24 Hrs  T(C): 36.9 (02-09-24 @ 19:00), Max: 37.1 (02-09-24 @ 02:12)  T(F): 98.4 (02-09-24 @ 19:00), Max: 98.7 (02-09-24 @ 02:12)  HR: 64 (02-09-24 @ 19:00) (58 - 87)  BP: 92/53 (02-09-24 @ 19:00) (92/52 - 114/79)  BP(mean): --  RR: 18 (02-09-24 @ 19:00) (17 - 18)  SpO2: 97% (02-09-24 @ 19:00) (97% - 99%)

## 2024-02-09 NOTE — BH INPATIENT PSYCHIATRY ASSESSMENT NOTE - HPI (INCLUDE ILLNESS QUALITY, SEVERITY, DURATION, TIMING, CONTEXT, MODIFYING FACTORS, ASSOCIATED SIGNS AND SYMPTOMS)
23F w/ PPHx depression, one previous IPU five years ago for suicidal ideation at [facility], one past suicide attempt via overdose requiring ED stay, presents with anxiety after ingesting one 100 mg THC gummy. Pt is withdrawn and a poor historian on interview, states she took the edible around two days ago because she had "seen it in the shop" when she was walking with her boyfriend. Shortly after ingesting the edible, pt states "everything looked like it was spinning" and felt anxiety. She also endorses having intrusive thoughts that she was dead, and states she was not able to tell if she was in a dream or reality. Originally presented to an ED in [place] where she received IV fluids, then was discharged. Pt states she continued to have perceptual abnormalities afterward, stating "everything looks blurry." Denies AH, VH. Denies persecutory delusions or ideas of reference. On presentation to  ED, she states she was worried she "everything would start spinning again" and requested inpatient admission. Denies recent depression or low mood, states "I don't know" to all questions about appetite, sleep, concentration, and energy level. Denies SI/I/P, HI. Denies hx of manic sxs.

## 2024-02-09 NOTE — BH PATIENT PROFILE - PATIENT REPRESENTATIVE: ( YOU CAN CHOOSE ANY PERSON THAT CAN ASSIST YOU WITH YOUR HEALTH CARE PREFERENCES, DOES NOT HAVE TO BE A SPOUSE, IMMEDIATE FAMILY OR SIGNIFICANT OTHER/PARTNER)
[facility]  Office: [address], DO, Moses [place], DO, Jean Dawkins, DO, Suni Sabins Blood, DO, Pascual Mchugh MD, Laz Elliott MD, Ethel Murphy MD, Angelina Laguna MD, Adeline Goldmann, MD, Jeni Stone MD, Yvan Amezquita MD, Hao Christy MD, Dru Divers, DO, Feli Jang, DO, [place] Malagon MD,  Val Friday, DO, Karoline Hernandez MD, Rodrigue David MD, Michelle Jimenez MD, Carli Bartlett MD, Anju Clancy MD, Javed Ramey MD, Stephanie Arellano, [facility], [facility], [facility], Jarred Dumas, CNP, Noah Jarvis, CNS, Gina Brar, CNP, Mesfin Monroy, CNP, Yoly Huynh, CNP, Tommy Chu, CNP, Zamzam Yates, CNP, Anne aRmirez, CNP, PRESLEY GarciaC, Sophy Carroll, [facility], Ivory Castro, CNP, Nadeem Duenas, CNP, Piyush Blevins, CNP, Syeda Sims, CNP, Reshma Squibb, CNP, Marcus Holcomb, CNP, [facility]    Progress Note    10/27/2021    8:27 AM    Name:   Giovanna Bai  MRN:     [id number]     [place]:      [de-identified]   Room:   [address] [place] [address] Day:  15  Admit Date:  10/12/2021  2:53 PM    PCP:   Avis Pérez MD  Code Status:  Full Code    Subjective:     C/C:   Chief Complaint   Patient presents with    Respiratory Distress     EMS reported 97% on room air; pt arrives with saturation 45% on room air    Failure To Thrive     Interval History Status: improved. Patient seen and examined at dialysis site again today, she seems more anxious today compared to yesterday but denies any pain , feeling better overall , still with urine output, and I/O noted   Slightly tachycardic   Patient vitals, labs and all providers notes were reviewed,from overnight shift and morning updates were noted and discussed with the nurse    Brief History:     Per my ELANA:  \"Danielle Wadsworth is a 68 y. o. female with a hx of CKD 4 and iron deficiency anemia who presented to [facility] with Respiratory Distress and hypoxia (O2 sats 45%) and failure to thrive and is admitted to the hospital for the management of right Pleural effusion and acute renal failure. Patient lives at home alone and has recently been having difficulty caring for herself and generalized weakness for the last few weeks . She does have chronic BLE edema but denies hx of CHF. Initial CXR showed large right pleural effusion, stat elevated BNP and D-dimer as well as BUN/creatinine.  Last took known creatinine was 2.34 in august, she reports she seen a nephrologist once. She does not wear home oxygen   10/13: US guided right thoracentesis 850ml of nonturbid straw colored fluid removed -transudative  Pleural fluid cultures negative for malignancy\"  Chest tube placed 10/18 on right for loculated effusion   Started on heparin drip for possible PE; VQ being repeated 10/24    Review of Systems:     Review of Systems   Constitutional: Positive for activity change, appetite change and fatigue. Negative for chills, diaphoresis and fever. HENT: Negative for congestion. Eyes: Negative for visual disturbance. Respiratory: Positive for shortness of breath. Negative for cough, chest tightness and wheezing. Cardiovascular: Negative for chest pain, palpitations and leg swelling. Gastrointestinal: Negative for abdominal pain, blood in stool, constipation, diarrhea, nausea and vomiting. Genitourinary: Negative for difficulty urinating. Neurological: Positive for weakness. Negative for dizziness, light-headedness, numbness and headaches. Psychiatric/Behavioral: The patient is nervous/anxious. All other systems reviewed and are negative. Medications: Allergies:     Allergies   Allergen Reactions    Penicillins Swelling       Current Meds:   Scheduled Meds:    aztreonam  1,000 mg IntraVENous Q24H    methylPREDNISolone  30 mg IntraVENous Q12H    vancomycin (VANCOCIN) intermittent dosing (placeholder)   Other RX Placeholder    heparin (porcine)  5,000 Units 29.8* 32.0* 29.0*   MCV 84.2 85.3 85.5   MCH 24.9* 24.8* 24.8*   MCHC 29.5 29.1 29.0   RDW 21.8* 23.5* 23.5*    300 227   MPV 10.5 10.5 11.2     Chemistry:  Recent Labs     10/25/21  0614 10/26/21  0535 10/27/21  0553    136 135   K 4.6 4.5 5.0   CL 96* 96* 99   CO2 25 25 23   GLUCOSE 162* 136* 116*   BUN 85* 88* 53*   CREATININE 3.08* 3.07* 2.19*   ANIONGAP 16 15 13   LABGLOM 15* 15* 22*   GFRAA 18* 18* 26*   CALCIUM 7.9* 8.1* 8.3*     Recent Labs     10/25/21  2010 10/26/21  0748 10/26/21  1321 10/26/21  1719 10/26/21  2029 10/27/21  0713   POCGLU 173* 139* 114* 112* 115* 113*     ABG:  Lab Results   Component Value Date    POCPH 7.456 10/24/2021    POCPCO2 41.1 10/24/2021    POCPO2 53.3 10/24/2021    POCHCO3 28.9 10/24/2021    NBEA NOT REPORTED 10/24/2021    PBEA 5 10/24/2021    TYK6YDR NOT REPORTED 10/24/2021    ISEK6OLT 89 10/24/2021    FIO2 NOT REPORTED 10/24/2021     Lab Results   Component Value Date/Time    SPECIAL RTAC,20CC 10/26/2021 06:28 PM    SPECIAL LTAC 10/26/2021 06:28 PM     Lab Results   Component Value Date/Time    CULTURE NO GROWTH 7 HOURS 10/26/2021 06:28 PM    CULTURE NO GROWTH 7 HOURS 10/26/2021 06:28 PM       Radiology:  XR CHEST (SINGLE VIEW FRONTAL)    Result Date: 10/24/2021  Small stable left effusion and left lower lobe atelectatic changes. Stable right chest tube. Cardiomegaly and COPD redemonstrated. NM LUNG SCAN PERFUSION ONLY    Result Date: 10/21/2021  Exam is technically high probability for pulmonary embolism. While emboli are possible, the diminished activity within the upper lung zones could also be secondary to emphysema; a  ventilation study would be needed for confirmation. This could be obtained at a later time as clinically warranted. Improving perfusion at the lateral right lung base. Findings were discussed with Kerline Holguin at 12:13 on 10/21/2021.      CT ABDOMEN PELVIS W IV CONTRAST Additional Contrast? Radiologist Recommendation    Result Date: 10/25/2021  1. Right pneumothorax with chest tube in place. Please see separate chest CT report for details of this region. 2.  Large rectal colorectal stool burden and mild gaseous distention of the colon. XR CHEST PORTABLE    Result Date: 10/25/2021  Small bore right chest tube in unchanged position. Smaller but persistent right pneumothorax; otherwise, unchanged findings. Stable left effusion with left lower lobe volume loss. COPD. XR CHEST PORTABLE    Result Date: 10/23/2021  Questionable trace focal pneumothorax laterally in the right base near the chest tube. Persistent by a basilar airspace disease with small left pleural effusion. XR CHEST PORTABLE    Result Date: 10/22/2021  Stable right chest tube without demonstrable pneumothorax Improvement in now mild right basilar opacity related to minimal atelectasis and or effusion Stable moderate left basilar opacity     XR CHEST PORTABLE    Result Date: 10/21/2021  Stable right chest tube without pneumothorax Stable bibasilar pleuroparenchymal changes     XR CHEST PORTABLE    Result Date: 10/20/2021  No significant interval change. Right chest tube in place with small right pleural effusion and right basilar opacity. Unchanged moderate left pleural effusion. XR CHEST 1 VIEW    Result Date: 10/26/2021  Interval placement of tunneled dialysis catheter with tip in appropriate position. Small right basilar pneumothorax no longer present. Chest tube upsize with tip in right base. Attention for pneumothorax on serial portable. .     CT CHEST PULMONARY EMBOLISM W CONTRAST    Addendum Date: 10/25/2021    ADDENDUM: Findings were discussed with IVA SURESH at 1:30 pm on 10/25/2021. Result Date: 10/25/2021  Loculated left basilar effusion with adjacent consolidation representing atelectasis versus pneumonia. Focus of consolidation in the right lower lobe representing atelectasis versus pneumonia.  Moderate to large right-sided pneumothorax with an indwelling pigtail catheter in the right basilar pleural space. IR TUNNELED CVC PLACE WO SQ PORT/PUMP > 5 YEARS    Result Date: 10/26/2021  Successful ultrasound and fluoroscopy guided tunneled catheter placement . The tunneled dialysis catheter may be used immediately. IR CHANGE DRAINAGE CATH    Result Date: 10/26/2021  Chest tube upsized to [address]. Attached to wall suction at which time air was aspirated consistent with pneumothorax. Patient hypoxia improved from 84 to 94%. These findings were communicated to the ICU doctor in person at 9:45 a.m. Yanni Ricketts Physical Examination:        Physical Exam  Vitals and nursing note reviewed. Constitutional:       General: She is not in acute distress. Appearance: She is obese. She is ill-appearing. Interventions: Nasal cannula in place. HENT:      Head: Normocephalic and atraumatic. Eyes:      Conjunctiva/sclera: Conjunctivae normal.      Pupils: Pupils are equal, round, and reactive to light. Cardiovascular:      Rate and Rhythm: Normal rate and regular rhythm. Heart sounds: No murmur heard. Pulmonary:      Effort: Pulmonary effort is normal. No accessory muscle usage or respiratory distress. Breath sounds: No stridor. Examination of the right-lower field reveals decreased breath sounds. Examination of the left-lower field reveals decreased breath sounds. Decreased breath sounds present. No wheezing, rhonchi or rales. Abdominal:      General: Bowel sounds are normal. There is no distension. Palpations: Abdomen is soft. Abdomen is not rigid. Tenderness: There is no abdominal tenderness. There is no guarding. Musculoskeletal:         General: No tenderness. Skin:     General: Skin is warm and dry. Findings: No erythema, lesion or rash. Neurological:      Mental Status: She is alert and oriented to person, place, and time. Cranial Nerves: No cranial nerve deficit. Motor: No seizure activity. same name as above Psychiatric:         Speech: Speech normal.         Behavior: Behavior normal. Behavior is cooperative. Assessment:        Hospital Problems         Last Modified POA    * (Principal) Pleural effusion on right 10/15/2021 Yes    Acute renal failure with acute renal cortical necrosis superimposed on stage 4 chronic kidney disease ([address] 10/14/2021 Yes    Acute respiratory failure with hypoxia ([address] 10/13/2021 Yes    Iron deficiency anemia 10/12/2021 Yes    Essential hypertension 10/12/2021 Yes    General weakness 10/12/2021 Yes    Vitamin D deficiency 10/13/2021 Yes    Acute diastolic heart failure ([address] 10/13/2021 Yes    Hypoxia 10/13/2021 Yes    Anemia 10/13/2021 Yes    Peripheral edema 10/13/2021 Yes    Moderate protein-calorie malnutrition ([address] 10/13/2021 Yes    Severe malnutrition ([address] 10/13/2021 Yes    Acute kidney injury ([address] 10/15/2021 Yes    Hypokalemia 10/17/2021 Yes    Other emphysema ([address] 10/17/2021 Yes          Plan:           Acute hypoxemic respiratory failure: Likely secondary to  pleural effusion and pulmonary edema continue support with high flow nasal cannula    Moderate to large right-sided pleural effusion: Ex lap PE thoracentesis    Pneumothorax: Status post chest tube placement, chest tube exchanged 10/26  continue to continuous suction, CXR from this am with PNTX resolution, might trial water seal and monitor response appreciate pulmonology recommendation   Abx added per pulmonology, so far I don't see fluid culture, fluids with no significant count, pulm added ID consult, appreciate recs    Acute kidney injury/ CKD 4: Likely secondary CHF  Urine retention: Patient needed hemodialysis, appreciate nephrology input. Renal diet    Acute decompensated diastolic heart failure: Diuresis initially, now on hemodialysis, continue, strict I's & O's, daily weight, cardiac diet and fluid restriction    Hyperkalemia: Currently resolved. Severe malnutrition: Continue boost and encourage p.o. intake. COPD exacerbation: Continue treatment/inhalers/IV steroids    Patient condition continues to be critical, continue monitor closely.     Discussed with the patient and nurse      Lamonte Sultana MD  10/27/2021  8:27 AM

## 2024-02-09 NOTE — BH INPATIENT PSYCHIATRY ASSESSMENT NOTE - NSBHMETABOLIC_PSY_ALL_CORE_FT
BMI:   HbA1c:   Glucose:   BP: --Vital Signs Last 24 Hrs  T(C): 36.9 (02-09-24 @ 19:00), Max: 37.1 (02-09-24 @ 02:12)  T(F): 98.4 (02-09-24 @ 19:00), Max: 98.7 (02-09-24 @ 02:12)  HR: 64 (02-09-24 @ 19:00) (58 - 87)  BP: 92/53 (02-09-24 @ 19:00) (92/52 - 114/79)  BP(mean): --  RR: 18 (02-09-24 @ 19:00) (17 - 18)  SpO2: 97% (02-09-24 @ 19:00) (97% - 99%)      Lipid Panel:

## 2024-02-09 NOTE — ED BEHAVIORAL HEALTH ASSESSMENT NOTE - OTHER
Shifting position continuously, throws head back into pillow Unable to care for self, as patient is not at her baseline, and is acutely disorganized. Boyfriend Sounds child-like Bed search in progress

## 2024-02-09 NOTE — ED BEHAVIORAL HEALTH ASSESSMENT NOTE - SUMMARY
Patient is a 23 year old female, single, no dependents, college student, works as , domiciled with boyfriend, with no relevant past medical history, with past psychiatric history of bipolar disorder, not currently in care or treatment, one prior IPP admission in 2018 for SI, no prior SA, substance use of cannabis, ED visit yesterday for same complaint,  presented to the ED with family after consuming an edible, and experiencing increased confusion, anxiety, and disorganization.     Upon evaluation, patient appears labile, acutely distressed, child-like at times, and mainly non-linear. Per collateral, patient ingested a THC gummy on 2/7/24 at 20:30, with symptoms of anxiety manifesting almost immediately; with that timeline, and with patient baselines being immediately prior to consuming substance, patient is most likely presenting with cannabis induced mood disorder. She does not appear to be acutely psychotic or manic, but presents with thought disorganization and anxiety, with increased sleep, decreased appetite, and inability to return to normal routine. Patient's symptoms have not resolved, despite an ED visit yesterday and over 40 hours to metabolize the substance, and thus may benefit from an inpatient admission for stabilization, medication management, and symptom remission. Patient is amenable, and thus will be admitted under a voluntary status (9.13). As patient is current activated, and acutely distressed, she will benefit from a one time dose of olanzapine 5mg PO, as it is sedating.    Recommendations  - Admit to inpatient psychiatry on 9.13 legal status (voluntary) once medically cleared  - We recommend for continuation of 1:1 while in the ED; no indication for 1:1 while on the inpatient psychiatric floor.  - Please obtain COVID PCR  - For acute symptom relief, can give one time dose of olanzapine 5mg PO  - Will defer decision on standing medications to inpatient psychiatric team Patient is a 23 year old female, single, no dependents, college student, works as , domiciled with boyfriend, with no relevant past medical history, with past psychiatric history of bipolar disorder, not currently in care or treatment, one prior IPP admission in 2018 for SI, no prior SA, substance use of cannabis, ED visit yesterday for same complaint,  presented to the ED with family after consuming an edible, and experiencing increased confusion, anxiety, and disorganization.     Upon evaluation, patient appears labile, acutely distressed, child-like at times, and mainly non-linear. Per collateral, patient ingested a THC gummy on 2/7/24 at 20:30, with symptoms of anxiety manifesting almost immediately; with that timeline, and with patient's baseline being immediately prior to consuming the substance, patient is most likely presenting with psychosis secondary to substance use. However, the differential remains broad, as it could be secondary to a primary mood disorder as well, such as her reported history of bipolar disorder.  She does not appear to be acutely psychotic or manic, but presents with thought disorganization and anxiety, with increased sleep, decreased appetite, and inability to return to normal routine. Patient's symptoms have not resolved, despite an ED visit yesterday and over 40 hours to metabolize the substance, and thus may benefit from an inpatient admission for stabilization, medication management, and symptom remission. Patient is amenable, and thus will be admitted under a voluntary status (9.13). As patient is current activated, and acutely distressed, she will benefit from a one time dose of olanzapine 5mg PO, as it is sedating.    Recommendations  - Admit to inpatient psychiatry on 9.13 legal status (voluntary) once medically cleared  - We recommend for continuation of 1:1 while in the ED; no indication for 1:1 while on the inpatient psychiatric floor.  - Please obtain COVID PCR  - For acute symptom relief, can give one time dose of olanzapine 5mg PO  - Will defer decision on standing medications to inpatient psychiatric team

## 2024-02-10 LAB
CULTURE RESULTS: SIGNIFICANT CHANGE UP
SPECIMEN SOURCE: SIGNIFICANT CHANGE UP

## 2024-02-10 PROCEDURE — 99232 SBSQ HOSP IP/OBS MODERATE 35: CPT

## 2024-02-10 RX ORDER — HYDROXYZINE HCL 10 MG
25 TABLET ORAL ONCE
Refills: 0 | Status: COMPLETED | OUTPATIENT
Start: 2024-02-10 | End: 2024-02-10

## 2024-02-10 RX ADMIN — Medication 1 MILLIGRAM(S): at 08:40

## 2024-02-10 RX ADMIN — Medication 25 MILLIGRAM(S): at 17:29

## 2024-02-10 NOTE — BH INPATIENT PSYCHIATRY PROGRESS NOTE - NSBHCHARTREVIEWVS_PSY_A_CORE FT
Vital Signs Last 24 Hrs  T(C): 36.8 (02-10-24 @ 08:17), Max: 36.9 (02-09-24 @ 19:00)  T(F): 98.2 (02-10-24 @ 08:17), Max: 98.4 (02-09-24 @ 19:00)  HR: 64 (02-09-24 @ 19:00) (58 - 64)  BP: 92/53 (02-09-24 @ 19:00) (92/52 - 92/53)  BP(mean): --  RR: 16 (02-10-24 @ 08:17) (16 - 18)  SpO2: 97% (02-09-24 @ 19:00) (97% - 99%)    Orthostatic VS  02-10-24 @ 08:17  Lying BP: --/-- HR: --  Sitting BP: 112/67 HR: 87  Standing BP: 106/69 HR: 112  Site: --  Mode: --  Orthostatic VS  02-09-24 @ 20:37  Lying BP: --/-- HR: --  Sitting BP: 87/67 HR: 74  Standing BP: 95/65 HR: 89  Site: --  Mode: --

## 2024-02-10 NOTE — BH INPATIENT PSYCHIATRY PROGRESS NOTE - NSBHMETABOLIC_PSY_ALL_CORE_FT
BMI: BMI (kg/m2): 22.7 (02-09-24 @ 20:37)  HbA1c:   Glucose:   BP: --Vital Signs Last 24 Hrs  T(C): 36.8 (02-10-24 @ 08:17), Max: 36.9 (02-09-24 @ 19:00)  T(F): 98.2 (02-10-24 @ 08:17), Max: 98.4 (02-09-24 @ 19:00)  HR: 64 (02-09-24 @ 19:00) (58 - 64)  BP: 92/53 (02-09-24 @ 19:00) (92/52 - 92/53)  BP(mean): --  RR: 16 (02-10-24 @ 08:17) (16 - 18)  SpO2: 97% (02-09-24 @ 19:00) (97% - 99%)    Orthostatic VS  02-10-24 @ 08:17  Lying BP: --/-- HR: --  Sitting BP: 112/67 HR: 87  Standing BP: 106/69 HR: 112  Site: --  Mode: --  Orthostatic VS  02-09-24 @ 20:37  Lying BP: --/-- HR: --  Sitting BP: 87/67 HR: 74  Standing BP: 95/65 HR: 89  Site: --  Mode: --    Lipid Panel:

## 2024-02-11 PROCEDURE — 99232 SBSQ HOSP IP/OBS MODERATE 35: CPT

## 2024-02-11 RX ORDER — HYDROXYZINE HCL 10 MG
25 TABLET ORAL ONCE
Refills: 0 | Status: COMPLETED | OUTPATIENT
Start: 2024-02-11 | End: 2024-02-11

## 2024-02-11 RX ADMIN — Medication 25 MILLIGRAM(S): at 12:54

## 2024-02-11 RX ADMIN — Medication 1 MILLIGRAM(S): at 14:09

## 2024-02-11 NOTE — BH INPATIENT PSYCHIATRY PROGRESS NOTE - NSBHCHARTREVIEWLAB_PSY_A_CORE FT
LABS:                        13.4   8.44  )-----------( 226      ( 09 Feb 2024 05:40 )             41.1     02-09    139  |  107  |  10  ----------------------------<  96  3.6   |  26  |  0.69    Ca    9.1      09 Feb 2024 05:40    TPro  7.3  /  Alb  3.4<L>  /  TBili  0.2  /  DBili  x   /  AST  17  /  ALT  21  /  AlkPhos  84  02-09    
LABS:                        13.4   8.44  )-----------( 226      ( 09 Feb 2024 05:40 )             41.1     02-09    139  |  107  |  10  ----------------------------<  96  3.6   |  26  |  0.69    Ca    9.1      09 Feb 2024 05:40    TPro  7.3  /  Alb  3.4<L>  /  TBili  0.2  /  DBili  x   /  AST  17  /  ALT  21  /  AlkPhos  84  02-09

## 2024-02-11 NOTE — BH INPATIENT PSYCHIATRY PROGRESS NOTE - NSBHCHARTREVIEWVS_PSY_A_CORE FT
Vital Signs Last 24 Hrs  T(C): 36.9 (02-10-24 @ 20:29), Max: 36.9 (02-10-24 @ 20:29)  T(F): 98.5 (02-10-24 @ 20:29), Max: 98.5 (02-10-24 @ 20:29)  HR: --  BP: --  BP(mean): --  RR: 16 (02-10-24 @ 08:17) (16 - 16)  SpO2: --    Orthostatic VS  02-10-24 @ 08:17  Lying BP: --/-- HR: --  Sitting BP: 112/67 HR: 87  Standing BP: 106/69 HR: 112  Site: --  Mode: --  Orthostatic VS  02-09-24 @ 20:37  Lying BP: --/-- HR: --  Sitting BP: 87/67 HR: 74  Standing BP: 95/65 HR: 89  Site: --  Mode: --   Vital Signs Last 24 Hrs  T(C): 37.1 (02-11-24 @ 08:36), Max: 37.1 (02-11-24 @ 08:36)  T(F): 98.8 (02-11-24 @ 08:36), Max: 98.8 (02-11-24 @ 08:36)  HR: --  BP: --  BP(mean): --  RR: 17 (02-11-24 @ 08:36) (17 - 17)  SpO2: --    Orthostatic VS  02-11-24 @ 08:36  Lying BP: --/-- HR: --  Sitting BP: 119/62 HR: 84  Standing BP: 106/57 HR: 100  Site: upper right arm  Mode: electronic  Orthostatic VS  02-10-24 @ 08:17  Lying BP: --/-- HR: --  Sitting BP: 112/67 HR: 87  Standing BP: 106/69 HR: 112  Site: --  Mode: --  Orthostatic VS  02-09-24 @ 20:37  Lying BP: --/-- HR: --  Sitting BP: 87/67 HR: 74  Standing BP: 95/65 HR: 89  Site: --  Mode: --

## 2024-02-12 PROCEDURE — 99231 SBSQ HOSP IP/OBS SF/LOW 25: CPT

## 2024-02-12 PROCEDURE — 90853 GROUP PSYCHOTHERAPY: CPT

## 2024-02-12 RX ADMIN — Medication 1 MILLIGRAM(S): at 23:28

## 2024-02-12 RX ADMIN — Medication 650 MILLIGRAM(S): at 22:40

## 2024-02-12 NOTE — BH DISCHARGE NOTE NURSING/SOCIAL WORK/PSYCH REHAB - DISCHARGE INSTRUCTIONS AFTERCARE APPOINTMENTS
In order to check the location, date, or time of your aftercare appointment, please refer to your Discharge Instructions Document given to you upon leaving the hospital.  If you have lost the instructions please call [phone]

## 2024-02-12 NOTE — BH INPATIENT PSYCHIATRY PROGRESS NOTE - NSBHFUPINTERVALHXFT_PSY_A_CORE
Chart reviewed, case discussed in team. No acute events overnight or over the weekend. Patient now endorsing that following high dose THC edible ingestion late last week she now feels she has returned to baseline state of mental health. She reports euthymic mood, denies considerable anxiety, is sleeping well, and there is no evidence of, nor does the patient endorse any psychotic sx. She is AAox3 and in good behavioral control. Appetite is WNL. Denies SI/HI/VI, pleased with plan to go home tomorrow and return to therapist. She says she may consider tx for anxiety but with preference to pursue this as outpatient. 
Patient was seen and evaluated, chart, medications and labs reviewed. Case discussed with nursing team.  On service for this 23 year old single female, no dependents. 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  Patient is hospitalized with a primary problem of anxiety, emotional decompensation with s/p suicidal attempt by ingesting 100 mg THC gummy..      Case discussed with nursing team. No interval events reported by nursing.  Patient is not on  standing medications.  Eating and sleeping well.  No behavioral concerns, no prn’s for aggression.   Patient is observed on unit and is amenable to interview. She is cooperative upon approach. Patient reports feeling “very anxious” tearful and anxious during session, require Ativan 1mg po prn during session.  Pt denies any mood dysregulation, denies any current SI/SIB/HI, denies plan or intent on unit, and engages in safety plan on unit. Patient minimizes events leading to admission, denies a suicide attempt. Patient stated she took THC gummies with her boyfriend just wanting to experiment.  States after she ate it she began to feel “funny” Pt states she only ate 1 “I ate an edible and after it triggered something inside me with my anxiety”   Patient denies AVH, delusions, no signs of landy. No standing psychiatric medications at this time as there are lack of definable target symptoms, monitor off medications to see if visual disturbances and anxiety resolves. No acute medical concerns, VSS: 98.2, 112/67, 87, 16,  Continue to monitor and provide therapeutic support  
Patient was seen and evaluated, chart, medications and labs reviewed. Case discussed with nursing team.  On service for this 23year old single female, no dependents. Patient currently in college freshman at [employer], domiciled with boyfriend.  Patient is hospitalized with a primary problem of anxiety, emotional decompensation with s/p suicidal attempt by ingesting 100 mg THC gummy.    Case discussed with nursing team. No interval events reported by nursing.  Patient is not on standing medications.  No eating and sleeping disturbances.  No behavioral concerns, no prn’s for aggression.     Patient is observed on unit and is amenable to interview. She is cooperative, calm.  Patient reports feeling “I feel less anxious today”. Reports she was given  Ativan prn yesterday which helped “I was able to calm down and take a shower”.    Pt denies any mood dysregulation, denies any current SI/SIB/HI, denies plan or intent on unit, and engages in safety plan on unit. Reports more manageable anxiety today.  Discussed SSRI options for anxiety. Pt states she does not suffer from depression only anxiety, but does not want to be on standing medications. Pt reports she did have mood changes 2 months ago after starting birth control pills, but now she stopped her BC pills and has the implant.     Patient denies a suicide attempt upon admission. Patient stated she took THC gummies with her boyfriend just wanting to experiment.  States after she ate it she began to feel “funny” Pt states she only ate only 1 “I ate an edible and after it triggered something inside me with my anxiety”  no standing psychiatric medications at this time as there are lack of definable target symptoms, monitor off medications to see if visual disturbances and anxiety resolves.    No overt psychotic trend.  Patient denies AVH, delusions, landy. No acute medical concerns, no pain/discomfort.  VSS: 98.8, 119/62, 84, 17. Continue to monitor and provide therapeutic support.

## 2024-02-12 NOTE — BH INPATIENT PSYCHIATRY PROGRESS NOTE - NSBHCHARTREVIEWVS_PSY_A_CORE FT
Vital Signs Last 24 Hrs  T(C): 36.8 (02-12-24 @ 08:14), Max: 37.1 (02-11-24 @ 20:31)  T(F): 98.2 (02-12-24 @ 08:14), Max: 98.8 (02-11-24 @ 20:31)  HR: --  BP: --  BP(mean): --  RR: --  SpO2: --    Orthostatic VS  02-12-24 @ 08:14  Lying BP: --/-- HR: --  Sitting BP: 109/60 HR: 86  Standing BP: 97/58 HR: 100  Site: --  Mode: --  Orthostatic VS  02-11-24 @ 08:36  Lying BP: --/-- HR: --  Sitting BP: 119/62 HR: 84  Standing BP: 106/57 HR: 100  Site: upper right arm  Mode: electronic

## 2024-02-12 NOTE — BH DISCHARGE NOTE NURSING/SOCIAL WORK/PSYCH REHAB - NSDCPRGOAL_PSY_ALL_CORE
During the current hospitalization, patient has been addressing psychiatric rehabilitation goals pertaining to identifying coping skills in decreasing delirium and anxiety. Patient has demonstrated progress towards psychiatric rehabilitation goals during the current hospitalization. Patient exhibited progress through participating in individual and group therapy and developing additional coping skills to assist with improving mood and decreasing symptoms. Pt gained insight into substance misuse and was receptive. Pt has been managing symptom with  some reality testing, positive distraction and self soothing skills. Pt utilized /will utilize coping skills such as grounding, opposite action, dialectics, listening to music, sleep, deep breathing, working out, reading, mindfulness, socialization, and positive distraction. Patient reports he will continue to practice coping skills. Patient was compliant with medication during current hospitalization. Pt is future oriented and looks forward to her boyfriend and college. Denied SI/HI. Pt stated she will not use Cannabis anymore.

## 2024-02-12 NOTE — BH DISCHARGE NOTE NURSING/SOCIAL WORK/PSYCH REHAB - NSCDUDCCRISIS_PSY_A_CORE
[facility]  1 [phone]) [facility]-WELL ([phone])  Text "WELL" to [phone]  Website: [email or web address]/.[facility] 1 [phone] 621-HOPE (7646) Website: www.safehorizon.org/.  [facility] – Crisis Care for Children, Adults and Families  [address] [place] [address]  Mobile Crisis Hotline – (221) 729-7019/.National Suicide Prevention Lifeline 4 (365) 801-4790/.  [facility]  1 (815) L[facility] (453-3712)/.  [facility]  (920) 262-3134/.  [facility] Behavioral Health Helpline / [facility] Mobile Crisis  (455) 053-AFCN (9535)/.  [facility] Response Crisis Hotline  (350) 413-7124  24 hour telephone crisis intervention and suicide prevention hotline concerned with all mental health issues/.  [facility]  [address] [place] [address] [phone]   Hours:  Monday through Friday from 9 AM to 3 PM/988 Suicide and Crisis Lifeline

## 2024-02-12 NOTE — BH INPATIENT PSYCHIATRY PROGRESS NOTE - NSBHATTESTTYPEVISIT_PSY_A_CORE
Attending Only
DANTE without on-site Attending supervision
Falls frequently
DANTE without on-site Attending supervision

## 2024-02-12 NOTE — BH DISCHARGE NOTE NURSING/SOCIAL WORK/PSYCH REHAB - NSDCPRRECOMMEND_PSY_ALL_CORE
Pt would benefit from following up with [facility] for structure, medication management and psychotherapy.

## 2024-02-12 NOTE — BH DISCHARGE NOTE NURSING/SOCIAL WORK/PSYCH REHAB - PATIENT PORTAL LINK FT
You can access the FollowMyHealth Patient Portal offered by [facility] by registering at the following website: [email or web address]. By joining [facility]’s FollowMyHealth portal, you will also be able to view your health information using other applications (apps) compatible with our system.

## 2024-02-12 NOTE — BH TREATMENT PLAN - ANXIETY/PANIC/FEAR NURSING INTERVENTIONS/RECOMMENDATIONS
assess pt for s/s of anxiety, educate pt about coping skills, encourage use of skills, medicate as prescribed, provide emotional support as needed

## 2024-02-12 NOTE — BH INPATIENT PSYCHIATRY PROGRESS NOTE - PRN MEDS
MEDICATIONS  (PRN):  acetaminophen     Tablet .. 650 milliGRAM(s) Oral every 6 hours PRN Temp greater or equal to 38C (100.4F), Mild Pain (1 - 3)  aluminum hydroxide/magnesium hydroxide/simethicone Suspension 30 milliLiter(s) Oral every 6 hours PRN Dyspepsia  hydrOXYzine hydrochloride 50 milliGRAM(s) Oral at bedtime PRN insomnia  LORazepam     Tablet 1 milliGRAM(s) Oral every 6 hours PRN Anxiety  magnesium hydroxide Suspension 30 milliLiter(s) Oral daily PRN Constipation  OLANZapine 5 milliGRAM(s) Oral every 6 hours PRN moderate agitation / disrganized behavior  OLANZapine Injectable 5 milliGRAM(s) IntraMuscular once PRN severe agitation  

## 2024-02-12 NOTE — BH INPATIENT PSYCHIATRY PROGRESS NOTE - NSBHASSESSSUMMFT_PSY_ALL_CORE
23 F w/ PPHx depression, one previous overdose and IPU 5 years ago for passive SI, presenting with anxiety and perceptual disturbances two days after edible ingestion. Given onset of sxs of visual changes and anxiety immediately after edible ingestion, most likely diagnosis is substance induced mood disorder. Currently, pt states visual disturbances are improving and she is mostly afraid that they will come back. Does not appear acutely psychotic or manic, although diagnosis remains unclear I/s/o recent substance use and sleep disturbances. Patient currently is affectively dysregulated, mildly paranoid and guarded, sxs are impairing her ability to care for self and complete ADLs. Continued inpatient admission required for stabilization, medication management and linkage to outpatient care.     On assessment today patient appears to now have returned to baseline state of mental health, presentation consistent with delirium or psychosis in the context of high dose THC ingestion. There are no apparent residual sx at this time, patient appropriate for dc tomorrow to outpatient tx.     Plan:  1. Legal: Admitted on/continue 9.13 status  2. Safety: No reported SI/SIB/HI/VI currently on unit; continue routine observation.  -Haldol/Ativan PRN medications for safety/agitation  3. Psychiatric:  - no standing psychiatric medications at this time as there are lack of definable target symptoms, monitor off medications to see if visual disturbances and anxiety self resolve.   4. Therapy: group and milieu therapy  5. Medical: No acute medical needs identified  6. Collateral: Collateral pending from mother and boyfriend  7. Disposition: likely to home tomorrow.     
23 F w/ PPHx depression, one previous overdose and IPU 5 years ago for passive SI, presenting with anxiety and perceptual disturbances two days after edible ingestion. Given onset of sxs of visual changes and anxiety immediately after edible ingestion, most likely diagnosis is substance induced mood disorder. Currently, pt states visual disturbances are improving and she is mostly afraid that they will come back. Does not appear acutely psychotic or manic, although diagnosis remains unclear I/s/o recent substance use and sleep disturbances. Patient currently is affectively dysregulated, mildly paranoid and guarded, sxs are impairing her ability to care for self and complete ADLs. Continued inpatient admission required for stabilization, medication management and linkage to outpatient care.       Plan:  1. Legal: Admitted on/continue 9.13 status  2. Safety: No reported SI/SIB/HI/VI currently on unit; continue routine observation.  -Haldol/Ativan PRN medications for safety/agitation  3. Psychiatric:  - no standing psychiatric medications at this time as there are lack of definable target symptoms, monitor off medications to see if visual disturbances and anxiety self resolve.   4. Therapy: group and milieu therapy  5. Medical: No acute medical needs identified  6. Collateral: Collateral pending from mother and boyfriend  7. Disposition: Pending clinical improvement, likely to home    
23 F w/ PPHx depression, one previous overdose and IPU 5 years ago for passive SI, presenting with anxiety and perceptual disturbances two days after edible ingestion. Given onset of sxs of visual changes and anxiety immediately after edible ingestion, most likely diagnosis is substance induced mood disorder. Currently, pt states visual disturbances are improving and she is mostly afraid that they will come back. Does not appear acutely psychotic or manic, although diagnosis remains unclear I/s/o recent substance use and sleep disturbances. Patient currently is affectively dysregulated, mildly paranoid and guarded, sxs are impairing her ability to care for self and complete ADLs. Continued inpatient admission required for stabilization, medication management and linkage to outpatient care.       Plan:  1. Legal: Admitted on/continue 9.13 status  2. Safety: No reported SI/SIB/HI/VI currently on unit; continue routine observation.  -Haldol/Ativan PRN medications for safety/agitation  3. Psychiatric:  - no standing psychiatric medications at this time as there are lack of definable target symptoms, monitor off medications to see if visual disturbances and anxiety self resolve.   4. Therapy: group and milieu therapy  5. Medical: No acute medical needs identified  6. Collateral: Collateral pending from mother and boyfriend  7. Disposition: Pending clinical improvement, likely to home

## 2024-02-12 NOTE — BH INPATIENT PSYCHIATRY PROGRESS NOTE - NSBHMETABOLIC_PSY_ALL_CORE_FT
BMI: BMI (kg/m2): 22.7 (02-09-24 @ 20:37)  HbA1c:   Glucose:   BP: --Vital Signs Last 24 Hrs  T(C): 36.8 (02-12-24 @ 08:14), Max: 37.1 (02-11-24 @ 20:31)  T(F): 98.2 (02-12-24 @ 08:14), Max: 98.8 (02-11-24 @ 20:31)  HR: --  BP: --  BP(mean): --  RR: --  SpO2: --    Orthostatic VS  02-12-24 @ 08:14  Lying BP: --/-- HR: --  Sitting BP: 109/60 HR: 86  Standing BP: 97/58 HR: 100  Site: --  Mode: --  Orthostatic VS  02-11-24 @ 08:36  Lying BP: --/-- HR: --  Sitting BP: 119/62 HR: 84  Standing BP: 106/57 HR: 100  Site: upper right arm  Mode: electronic    Lipid Panel:

## 2024-02-12 NOTE — PSYCHIATRIC REHAB INITIAL EVALUATION - NSBHPRRECOMMEND_PSY_ALL_CORE
Writer met with 22 yo domiciled female pt who was amenable to interview. Pt was forthcoming and verbal. Pt is currently open for therapy and medication adherence. Writer introduced pt to the unit group therapy, milieu therapy, treatment process and psychiatric rehabilitation services. Pt was receptive to attending group therapy. Dx: Substance Induced Mood.     Pt was admitted due to impulsively ingesting 100mg THC gummy. Prior admission 5 years ago for OD. Pt stated she felt like she was spinning and delirious. This triggered anxiety attack. Anxious mood;  Pt endorsed poor sleep. Insight and judgement impaired. Pt denied she uses Cannabis regularly. Denied SI/HI. Identified protective factors. Attends [employer]

## 2024-02-12 NOTE — BH INPATIENT PSYCHIATRY PROGRESS NOTE - NSBHATTESTBILLING_PSY_A_CORE
62610-Ppeinvyyyv OBS or IP - moderate complexity OR 35-49 mins
14082-Rieumrmxrd OBS or IP - moderate complexity OR 35-49 mins
58551-Cylafpbdqn OBS or IP - low complexity OR 25-34 mins

## 2024-02-12 NOTE — BH INPATIENT PSYCHIATRY PROGRESS NOTE - CURRENT MEDICATION
MEDICATIONS  (STANDING):    MEDICATIONS  (PRN):  acetaminophen     Tablet .. 650 milliGRAM(s) Oral every 6 hours PRN Temp greater or equal to 38C (100.4F), Mild Pain (1 - 3)  aluminum hydroxide/magnesium hydroxide/simethicone Suspension 30 milliLiter(s) Oral every 6 hours PRN Dyspepsia  hydrOXYzine hydrochloride 50 milliGRAM(s) Oral at bedtime PRN insomnia  LORazepam     Tablet 1 milliGRAM(s) Oral every 6 hours PRN Anxiety  magnesium hydroxide Suspension 30 milliLiter(s) Oral daily PRN Constipation  OLANZapine 5 milliGRAM(s) Oral every 6 hours PRN moderate agitation / disrganized behavior  OLANZapine Injectable 5 milliGRAM(s) IntraMuscular once PRN severe agitation  

## 2024-02-12 NOTE — BH INPATIENT PSYCHIATRY PROGRESS NOTE - MSE UNSTRUCTURED FT
The patient appears stated age, fair hygiene, dressed appropriately. She was calm, cooperative with the interview. She avoided eye contact. No psychomotor agitation or retardation. Steady gait. The patient's speech was hypophonic, difficult to understand. The patient's mood is "not good." Affect is constricted, anxious, consistent with stated mood. Patient is childlike, guarded but denying paranoid delusions. Well-related. The patient's thoughts are goal directed. Denies hallucinations but states vision is "blurry". She denies any suicidal or homicidal ideation, intent, or plan. Insight is fair. Judgement is impaired. Impulse control has been fair on the unit. 
The patient appears stated age, fair hygiene, dressed appropriately. She was calm, cooperative with the interview. She avoided eye contact. No psychomotor agitation or retardation. Steady gait. The patient's speech was hypophonic, difficult to understand. The patient's mood is "not good." Affect is constricted, anxious, consistent with stated mood. Patient is childlike, guarded but denying paranoid delusions. Well-related. The patient's thoughts are goal directed. Denies hallucinations but states vision is "blurry". She denies any suicidal or homicidal ideation, intent, or plan. Insight is fair. Judgement is impaired. Impulse control has been fair on the unit. 
Appearance: patient appears stated age, well-groomed and with good hygiene, dressed casually.   Behavior: Cooperative during interview, appropriate eye contact.   Speech: Normal rate and tone.    Motor: No PMA/R.   Gait: normal, ambulating without issue,   Mood: "Good, back to myself"   Affect: stable, fully reactive, mood congruent.   Thought Process: Linear, goal oriented.   Thought Content: patient denies SI/ HI, denies urges to self-injure.   Perceptual: No internal preoccupation, denies sensory disturbances.   Cognitive: attention/concentration intact.   Insight: good  Judgement: good   Impulse control: intact on unit.

## 2024-02-12 NOTE — BH INPATIENT PSYCHIATRY PROGRESS NOTE - NSICDXBHPRIMARYDX_PSY_ALL_CORE
Substance induced mood disorder   F19.83  
Substance induced mood disorder   F19.99  
Substance induced mood disorder   F19.07

## 2024-02-12 NOTE — BH PATIENT PROFILE - NSDYSPHAGSECTTWO_PSY_ALL_CORE
Patient: Jerardo Andarde Date of Service: 2024     : 1973 Attending: Roxi Hale MD   50 year old male      HYPERBARIC OXYGEN THERAPY PROCEDURE NOTE    Hyperbaric Treatment Number: 22 - Hyperbaric treatment scheduled once a day.   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 Roxi Hale   Consult/Update Date: 2023        PROCEDURE:  Pre-Hyperbaric Oxygen Therapy Treatment timeout was completed.    Treatment Profile: 33 fsw X 90 min  Treatment Start Time: 1106  Treatment End Time: 1251  Descent Time (min): 5 min  Air break total time: 0 min  Ascent time (min): 10    Hyperbaric oxygen therapy was monitored during entire course of treatment by the supervising physician.    HISTORY:  This is a 50 year old male  initially seen in consultation for right heel DFU which had been present since at least 2018; it had epithelialized, and then re-opened after transitioning out of a TCC and into footwear prior to custom orthotics in 2019.  He has a relative history of DM1, diabetic polyneuropathy, ESRD h/o HD, kidney transplant, anemia, right heel osteomyelitis ( and ), COVID19 related sepsis, severe PAD. In 9/15/22 his wound re-opened again. He denied any increased activity or changes in offloading, had continued to use crutches.      He was seeing Ml Akins NP at wound care at [facility].  She had instructed him to use a topical O2 \"boot\", and he has been using this most days (90 min) since Aug 2023.  Ml left the practice and he established care with vince NP, who began again the evaluation of osteo and vascular status, and has now referred the patient here for consideration of HBO (patient previously underwent several HBO treatments at [facility],  however claustrophobia prevented further treatments.     Physical therapy consult placed for gait training/ knee scooter education on 8/22 and 9/19/23.     On MRI 9/27/23 osteomyelitis noted. Referral placed for ID    Patient would like to transfer his wound care to [facility] moving forward. Denies fever,, chills, or shortness of breath.    Patient previously treated for right calcaneal osteo with prolonged course of IV antibiotics and partial calcanectomy.  Now with persistent osteo noted on MRI 9/2023    Patient was evaluated end of November 2023 by podiatry (Dr Beasley, DPM), no surgery currently indicated, according to the patient.  He does have history of partial calcanectomy (wound debridement, including bone, on 7/11/12), so options for further surgery may be limited.     Pt is at high risk for limb loss.  Dr. Hale saw in consult 12/7/2023 and diagnosed pt with CROM and recommended HBOT.    Risk Assessment at Consult:    Risk assessment was performed at time of consult/comprehensive evaluation on 12/26/2023. Specifically noted risks include:    Aside from anxiety/claustrophobia, patient tolerated previous HBO treatments at [facility].  Diabetic on glucose lowering meds.  Asthma - does not actively use albuterol inhaler    Interval History:   2/12/2024  The patient reports no complaints, no chest pain, no shortness of breath, no ear pain, and congestion.     Pertinent Reviewed:  Medical History and recent notes    PHYSICAL EXAM:  Daily Risk Assessment:  Finger Stick Glucose:      Recent Labs   Lab 02/12/24  1042   GLUCOSE BEDSIDE 130*     Vital signs:    Temp: 98.4 °F (36.9 °C)  Temp src: Temporal  Heart Rate: 86  Heart Rate Source: Monitor  BP: (!) 153/84     Resp: 18    General appearance:  alert, cooperative  Head:   normocephalic without obvious abnormality  Ears:   PE Tubes  Bilateral  Lungs:  clear to auscultation bilaterally  Heart:  regular rate and rhythm and S1, S2 normal    Wound exam completed on  2/6/24  Right foot with charcot foot deformity, granular tissue and periwound callus- debrided today. Palpates near bone, but no bone exposed. No ischemia or necrosis. No tunneling or undermining.         POST TREATMENT:    Patient denies complaints    Treatment extras: N/A     Post-treatment Exam:  No change in appearance or examination from pre-treatment exam.      ASSESSMENT: 50M running in HBO for R heel DFU refractory to >30 days of care.     MEDICAL DECISION MAKING:  Based on recent clinical evaluation, the patient is stabilizing.    Indications of this are:  Improved tissue viability.     Therefore, we will continue treatment as the patient will likely benefit from further hyperbaric oxygen therapy.     PLAN:  This is hyperbaric Treatment Number: 22 of anticipated 30 treatments.      02/12/24 - tolerated HBOT well. Bone scan to be performed tomorrow.    ID: pt had 2 doses of weekly Dalvance 12/22 & 12/29/2023  Saw Dr Chopra at [facility] 1/17/24: No antibiotics at this time.   CRP and ESR ordered 2/6/24 but not yet done.    Imaging: Bone scan scheduled for 2/12/24  Vascular:Revascularized 10/18/23 at [facility]  Surgery :DPM appt 2/28/2024    Plan discussed.  Patient stable at time of discharge.  All questions were answered prior to discharge.    Patient was examined and case discussed with Dr. Jamari Francois.     Significant note data copied forward from Dr. Leticia Mtz and updated for interval changes.    Dale Irving MD  [facility]/Hyperbaric Medicine & Wound Care Fellow  Wound Care Dept: [phone]  Hyperbaric Dept: [phone]    Teaching Attestation:  I have personally interviewed and examined the patient via face-to-face. I confirmed the findings listed below:    Diabetic ulcer of right heel associated with type 1 diabetes mellitus, with necrosis of bone (CMD)  (primary encounter diagnosis)     This was discussed with the fellow and I agree with the assessment and plan as documented. I was  present for the entire procedure.The plan of care was discussed with the patient.    Jamari Francois MD     N/A

## 2024-02-13 VITALS — TEMPERATURE: 98 F | RESPIRATION RATE: 18 BRPM

## 2024-02-13 PROCEDURE — 99239 HOSP IP/OBS DSCHRG MGMT >30: CPT

## 2024-02-13 NOTE — BH INPATIENT PSYCHIATRY DISCHARGE NOTE - NSBHFUPINTERVALHXFT_PSY_A_CORE
Chart reviewed, case discussed in team.  Chart reviewed, case discussed in team. Patient mildly anxious to get out of the hospital this AM, feels she has been at baseline the last couple of days, discussed plans for dc, looks forward to being with cats again, is relieved to have returned to usual state of mental health. Denies SI/HI/VI or significant anxiety. Sleeping and eating well. Social and visible in milieu, attending groups. Voices intent to never try any drug for the rest of her life.

## 2024-02-13 NOTE — BH INPATIENT PSYCHIATRY DISCHARGE NOTE - NSBHDCHANDOFFFT_PSY_ALL_CORE
Handoff including circumstance surrounding admission, past hx, hospital course, medications, and after care recs provided to Sujit at [facility], if questions regarding hospital course or patient come up following dc, please contact [facility] team at [facility] at [phone].

## 2024-02-13 NOTE — BH INPATIENT PSYCHIATRY DISCHARGE NOTE - HPI (INCLUDE ILLNESS QUALITY, SEVERITY, DURATION, TIMING, CONTEXT, MODIFYING FACTORS, ASSOCIATED SIGNS AND SYMPTOMS)
23F w/ PPHx depression, one previous IPU five years ago for suicidal ideation at [facility], one past suicide attempt via overdose requiring ED stay, presents with anxiety after ingesting one 100 mg THC gummy. Pt is withdrawn and a poor historian on interview, states she took the edible around two days ago because she had "seen it in the shop" when she was walking with her boyfriend. Shortly after ingesting the edible, pt states "everything looked like it was spinning" and felt anxiety. She also endorses having intrusive thoughts that she was dead, and states she was not able to tell if she was in a dream or reality. Originally presented to an ED in [place] where she received IV fluids, then was discharged. Pt states she continued to have perceptual abnormalities afterward, stating "everything looks blurry." Denies AH, VH. Denies persecutory delusions or ideas of reference. On presentation to  ED, she states she was worried she "everything would start spinning again" and requested inpatient admission. Denies recent depression or low mood, states "I don't know" to all questions about appetite, sleep, concentration, and energy level. Denies SI/I/P, HI. Denies hx of manic sxs.

## 2024-02-13 NOTE — BH INPATIENT PSYCHIATRY DISCHARGE NOTE - HOSPITAL COURSE
Dates of Hospitalization: 2/9/24-2/13/24    On presentation the unit the patient?  Given presenting symptoms and context surrounding admit, patient was XXXX    Risk benefit of medications was discussed, discussion regarding risks included but was not limited to NMS, TD, metabolic disturbances, and conduction abnormalities. The patient was in agreement with plan to start XXX??, which was titrated to XXX    Over the course of hospitalization the patient????      On the day of dc the patient was no longer    At the time of dc the patient and family were engaged in safety planning and denied safety concerns related to dispo, they verbalized willingness to call 911 or go to ER in the event that patient was felt to be a threat to self or others.      Risk Assessment: Patient poses elevated chronic risk for suicide given hx of suicide attempts (remote), some BPD traits, hx of trauma, patient poses low acute risk for suicide, self harm, or violence. Acute risk factors present on admit included; disorientation, severe anxiety, possibly psychotic sx and acute intoxication 2/2 consuming 100mg of THC due to limited understanding that this was an extremely high dose. As none of the acute risk factors are present at the time of dc (full remission of acute sx) she no longer poses elevated acute risk.     Discharge Diagnosis: Overdose on edible THC product (accidental)      Discharge Medications: none       Dates of Hospitalization: 2/9/24-2/13/24    On presentation the unit the patient was anxious and still with some residual effects of THC overdose, but had gradual improvement over the ensuing days, with ultimate remission of anxious sx and orientation. She was not started on any meds during admit, but did periodically take PRN ativan 1mg for anxiety, when she was concerned about residual sx or return of sx. After the weekend on the unit, patient appeared to have returned completely to baseline state of mental health and there was no evidence at any point that patient would pose a threat to self or others. At no point did she endorse SI/HI/VI. Given improvement and obvious proximate cause (THC overdose) pt was dc back to outpatient tx.     At the time of dc the patient engaged in safety planning and denied safety concerns related to dispo, they verbalized willingness to call 911 or go to ER in the event that patient was felt to be a threat to self or others.      Risk Assessment: Patient poses elevated chronic risk for suicide given hx of suicide attempts (remote), some BPD traits, hx of trauma, patient poses low acute risk for suicide, self harm, or violence. Acute risk factors present on admit included; disorientation, severe anxiety, possibly psychotic sx and acute intoxication 2/2 consuming 100mg of THC due to limited understanding that this was an extremely high dose. As none of the acute risk factors are present at the time of dc (full remission of acute sx) she no longer poses elevated acute risk.     Discharge Diagnosis: Overdose on edible THC product (accidental)      Discharge Medications: none

## 2024-02-13 NOTE — BH INPATIENT PSYCHIATRY DISCHARGE NOTE - NSBHFUPINTERVALCCFT_PSY_A_CORE
Patient seen on day of dc for follow up for THC induced anxiety and disorientation   Discharge Progress Note Date and Time: 02-13-24 @ 07:55

## 2024-02-13 NOTE — BH INPATIENT PSYCHIATRY DISCHARGE NOTE - NSBHMETABOLIC_PSY_ALL_CORE_FT
BMI: BMI (kg/m2): 22.7 (02-09-24 @ 20:37)  HbA1c:   Glucose:   BP: --Vital Signs Last 24 Hrs  T(C): 37.4 (02-12-24 @ 21:17), Max: 37.4 (02-12-24 @ 21:17)  T(F): 99.4 (02-12-24 @ 21:17), Max: 99.4 (02-12-24 @ 21:17)  HR: --  BP: --  BP(mean): --  RR: --  SpO2: --    Orthostatic VS  02-12-24 @ 08:14  Lying BP: --/-- HR: --  Sitting BP: 109/60 HR: 86  Standing BP: 97/58 HR: 100  Site: --  Mode: --  Orthostatic VS  02-11-24 @ 08:36  Lying BP: --/-- HR: --  Sitting BP: 119/62 HR: 84  Standing BP: 106/57 HR: 100  Site: upper right arm  Mode: electronic    Lipid Panel:

## 2024-02-13 NOTE — BH INPATIENT PSYCHIATRY DISCHARGE NOTE - NSBHASSESSSUMMFT_PSY_ALL_CORE
23 F w/ PPHx depression, one previous overdose and IPU 5 years ago for passive SI, presenting with anxiety and perceptual disturbances two days after edible ingestion. Given onset of sxs of visual changes and anxiety immediately after edible ingestion, most likely diagnosis is substance induced mood disorder. Patient had gradual return to baseline state of mental health and sensorium with complete return to baseline state of mental health by the time of dc.      There are no apparent residual sx at this time, patient appropriate for dc tomorrow to outpatient tx.     Plan:  1. Legal: Admitted on/continue 9.13 status  2. Safety: No reported SI/SIB/HI/VI currently on unit; continue routine observation.  -Haldol/Ativan PRN medications for safety/agitation  3. Psychiatric:  - no standing psychiatric medications at this time as there are lack of definable target symptoms, monitor off medications to see if visual disturbances and anxiety self resolve.   4. Therapy: group and milieu therapy  5. Medical: No acute medical needs identified

## 2024-02-13 NOTE — BH INPATIENT PSYCHIATRY DISCHARGE NOTE - MSE UNSTRUCTURED FT
Appearance: patient appears stated age, well-groomed and with good hygiene, dressed casually.   Behavior: Cooperative during interview, appropriate eye contact.   Speech: Normal rate and tone.    Motor: No PMA/R.   Gait: normal, ambulating without issue,   Mood: "Good"   Affect: stable, fully reactive, mood congruent.   Thought Process: Linear, goal oriented.   Thought Content: patient denies SI/ HI, denies urges to self-injure.   Perceptual: No internal preoccupation, denies sensory disturbances.   Cognitive: attention/concentration intact.   Insight: good  Judgement: good   Impulse control: intact on unit.

## 2024-02-13 NOTE — BH INPATIENT PSYCHIATRY DISCHARGE NOTE - DESCRIPTION
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

## 2024-02-25 ENCOUNTER — EMERGENCY (EMERGENCY)
Facility: HOSPITAL | Age: 24
LOS: 1 days | Discharge: HOME | End: 2024-02-25
Attending: EMERGENCY MEDICINE
Payer: MEDICAID

## 2024-02-25 VITALS
TEMPERATURE: 98 F | WEIGHT: 128.09 LBS | DIASTOLIC BLOOD PRESSURE: 66 MMHG | HEIGHT: 63 IN | OXYGEN SATURATION: 96 % | SYSTOLIC BLOOD PRESSURE: 101 MMHG | RESPIRATION RATE: 16 BRPM | HEART RATE: 94 BPM

## 2024-02-25 VITALS
SYSTOLIC BLOOD PRESSURE: 110 MMHG | TEMPERATURE: 98 F | OXYGEN SATURATION: 97 % | DIASTOLIC BLOOD PRESSURE: 64 MMHG | HEART RATE: 88 BPM | RESPIRATION RATE: 15 BRPM

## 2024-02-25 LAB
ALBUMIN SERPL ELPH-MCNC: 3.5 G/DL — SIGNIFICANT CHANGE UP (ref 3.5–5)
ALP SERPL-CCNC: 98 U/L — SIGNIFICANT CHANGE UP (ref 40–120)
ALT FLD-CCNC: 20 U/L DA — SIGNIFICANT CHANGE UP (ref 10–60)
ANION GAP SERPL CALC-SCNC: 3 MMOL/L — LOW (ref 5–17)
APPEARANCE UR: CLEAR — SIGNIFICANT CHANGE UP
AST SERPL-CCNC: 23 U/L — SIGNIFICANT CHANGE UP (ref 10–40)
BASOPHILS # BLD AUTO: 0.03 K/UL — SIGNIFICANT CHANGE UP (ref 0–0.2)
BASOPHILS NFR BLD AUTO: 0.3 % — SIGNIFICANT CHANGE UP (ref 0–2)
BILIRUB SERPL-MCNC: 0.4 MG/DL — SIGNIFICANT CHANGE UP (ref 0.2–1.2)
BILIRUB UR-MCNC: NEGATIVE — SIGNIFICANT CHANGE UP
BUN SERPL-MCNC: 11 MG/DL — SIGNIFICANT CHANGE UP (ref 7–18)
CALCIUM SERPL-MCNC: 9 MG/DL — SIGNIFICANT CHANGE UP (ref 8.4–10.5)
CHLORIDE SERPL-SCNC: 105 MMOL/L — SIGNIFICANT CHANGE UP (ref 96–108)
CO2 SERPL-SCNC: 26 MMOL/L — SIGNIFICANT CHANGE UP (ref 22–31)
COLOR SPEC: YELLOW — SIGNIFICANT CHANGE UP
CREAT SERPL-MCNC: 0.88 MG/DL — SIGNIFICANT CHANGE UP (ref 0.5–1.3)
DIFF PNL FLD: ABNORMAL
EGFR: 96 ML/MIN/1.73M2 — SIGNIFICANT CHANGE UP
EOSINOPHIL # BLD AUTO: 0.09 K/UL — SIGNIFICANT CHANGE UP (ref 0–0.5)
EOSINOPHIL NFR BLD AUTO: 0.8 % — SIGNIFICANT CHANGE UP (ref 0–6)
GLUCOSE SERPL-MCNC: 88 MG/DL — SIGNIFICANT CHANGE UP (ref 70–99)
GLUCOSE UR QL: NEGATIVE MG/DL — SIGNIFICANT CHANGE UP
HCG SERPL-ACNC: <1 MIU/ML — SIGNIFICANT CHANGE UP
HCG UR QL: NEGATIVE — SIGNIFICANT CHANGE UP
HCT VFR BLD CALC: 43.1 % — SIGNIFICANT CHANGE UP (ref 34.5–45)
HGB BLD-MCNC: 14.2 G/DL — SIGNIFICANT CHANGE UP (ref 11.5–15.5)
IMM GRANULOCYTES NFR BLD AUTO: 0.4 % — SIGNIFICANT CHANGE UP (ref 0–0.9)
KETONES UR-MCNC: NEGATIVE MG/DL — SIGNIFICANT CHANGE UP
LEUKOCYTE ESTERASE UR-ACNC: NEGATIVE — SIGNIFICANT CHANGE UP
LIDOCAIN IGE QN: 18 U/L — SIGNIFICANT CHANGE UP (ref 13–75)
LYMPHOCYTES # BLD AUTO: 1.39 K/UL — SIGNIFICANT CHANGE UP (ref 1–3.3)
LYMPHOCYTES # BLD AUTO: 12.4 % — LOW (ref 13–44)
MAGNESIUM SERPL-MCNC: 2.1 MG/DL — SIGNIFICANT CHANGE UP (ref 1.6–2.6)
MCHC RBC-ENTMCNC: 29.7 PG — SIGNIFICANT CHANGE UP (ref 27–34)
MCHC RBC-ENTMCNC: 32.9 GM/DL — SIGNIFICANT CHANGE UP (ref 32–36)
MCV RBC AUTO: 90.2 FL — SIGNIFICANT CHANGE UP (ref 80–100)
MONOCYTES # BLD AUTO: 0.96 K/UL — HIGH (ref 0–0.9)
MONOCYTES NFR BLD AUTO: 8.5 % — SIGNIFICANT CHANGE UP (ref 2–14)
NEUTROPHILS # BLD AUTO: 8.73 K/UL — HIGH (ref 1.8–7.4)
NEUTROPHILS NFR BLD AUTO: 77.6 % — HIGH (ref 43–77)
NITRITE UR-MCNC: NEGATIVE — SIGNIFICANT CHANGE UP
NRBC # BLD: 0 /100 WBCS — SIGNIFICANT CHANGE UP (ref 0–0)
PH UR: 6.5 — SIGNIFICANT CHANGE UP (ref 5–8)
PLATELET # BLD AUTO: 210 K/UL — SIGNIFICANT CHANGE UP (ref 150–400)
POTASSIUM SERPL-MCNC: 3.8 MMOL/L — SIGNIFICANT CHANGE UP (ref 3.5–5.3)
POTASSIUM SERPL-SCNC: 3.8 MMOL/L — SIGNIFICANT CHANGE UP (ref 3.5–5.3)
PROT SERPL-MCNC: 7.8 G/DL — SIGNIFICANT CHANGE UP (ref 6–8.3)
PROT UR-MCNC: ABNORMAL MG/DL
RBC # BLD: 4.78 M/UL — SIGNIFICANT CHANGE UP (ref 3.8–5.2)
RBC # FLD: 12.2 % — SIGNIFICANT CHANGE UP (ref 10.3–14.5)
SODIUM SERPL-SCNC: 134 MMOL/L — LOW (ref 135–145)
SP GR SPEC: 1.02 — SIGNIFICANT CHANGE UP (ref 1–1.03)
UROBILINOGEN FLD QL: 1 MG/DL — SIGNIFICANT CHANGE UP (ref 0.2–1)
WBC # BLD: 11.25 K/UL — HIGH (ref 3.8–10.5)
WBC # FLD AUTO: 11.25 K/UL — HIGH (ref 3.8–10.5)

## 2024-02-25 PROCEDURE — 85025 COMPLETE CBC W/AUTO DIFF WBC: CPT

## 2024-02-25 PROCEDURE — 74177 CT ABD & PELVIS W/CONTRAST: CPT | Mod: MC

## 2024-02-25 PROCEDURE — 36415 COLL VENOUS BLD VENIPUNCTURE: CPT

## 2024-02-25 PROCEDURE — 74177 CT ABD & PELVIS W/CONTRAST: CPT | Mod: 26,MC

## 2024-02-25 PROCEDURE — 81025 URINE PREGNANCY TEST: CPT

## 2024-02-25 PROCEDURE — 99285 EMERGENCY DEPT VISIT HI MDM: CPT

## 2024-02-25 PROCEDURE — 83690 ASSAY OF LIPASE: CPT

## 2024-02-25 PROCEDURE — 81001 URINALYSIS AUTO W/SCOPE: CPT

## 2024-02-25 PROCEDURE — 87086 URINE CULTURE/COLONY COUNT: CPT

## 2024-02-25 PROCEDURE — 99284 EMERGENCY DEPT VISIT MOD MDM: CPT | Mod: 25

## 2024-02-25 PROCEDURE — 96365 THER/PROPH/DIAG IV INF INIT: CPT | Mod: XU

## 2024-02-25 PROCEDURE — 80053 COMPREHEN METABOLIC PANEL: CPT

## 2024-02-25 PROCEDURE — 96375 TX/PRO/DX INJ NEW DRUG ADDON: CPT

## 2024-02-25 PROCEDURE — 83735 ASSAY OF MAGNESIUM: CPT

## 2024-02-25 PROCEDURE — 84702 CHORIONIC GONADOTROPIN TEST: CPT

## 2024-02-25 RX ORDER — ACETAMINOPHEN 500 MG
1000 TABLET ORAL ONCE
Refills: 0 | Status: COMPLETED | OUTPATIENT
Start: 2024-02-25 | End: 2024-02-25

## 2024-02-25 RX ORDER — KETOROLAC TROMETHAMINE 30 MG/ML
30 SYRINGE (ML) INJECTION ONCE
Refills: 0 | Status: DISCONTINUED | OUTPATIENT
Start: 2024-02-25 | End: 2024-02-25

## 2024-02-25 RX ORDER — SODIUM CHLORIDE 9 MG/ML
1000 INJECTION INTRAMUSCULAR; INTRAVENOUS; SUBCUTANEOUS ONCE
Refills: 0 | Status: COMPLETED | OUTPATIENT
Start: 2024-02-25 | End: 2024-02-25

## 2024-02-25 RX ADMIN — Medication 1000 MILLIGRAM(S): at 19:40

## 2024-02-25 RX ADMIN — SODIUM CHLORIDE 1000 MILLILITER(S): 9 INJECTION INTRAMUSCULAR; INTRAVENOUS; SUBCUTANEOUS at 18:29

## 2024-02-25 RX ADMIN — Medication 1000 MILLIGRAM(S): at 19:34

## 2024-02-25 RX ADMIN — Medication 400 MILLIGRAM(S): at 18:29

## 2024-02-25 RX ADMIN — Medication 30 MILLIGRAM(S): at 19:35

## 2024-02-25 RX ADMIN — Medication 30 MILLIGRAM(S): at 18:45

## 2024-02-25 NOTE — ED ADULT TRIAGE NOTE - IDEAL BODY WEIGHT(KG)
52 Adbry Pregnancy And Lactation Text: It is unknown if this medication will adversely affect pregnancy or breast feeding.

## 2024-02-25 NOTE — ED ADULT NURSE NOTE - OBJECTIVE STATEMENT
pt  is  a 24 y/o  female  with  c/o mid  abdominal pain w/ diarrhea  x  3  days. pt  denies any  nausea  and  vomiting abdomen soft  non distended +  BS  noted.

## 2024-02-25 NOTE — ED ADULT NURSE NOTE - NSFALLUNIVINTERV_ED_ALL_ED
Bed/Stretcher in lowest position, wheels locked, appropriate side rails in place/Call bell, personal items and telephone in reach/Instruct patient to call for assistance before getting out of bed/chair/stretcher/Non-slip footwear applied when patient is off stretcher/[place] to call system/Physically safe environment - no spills, clutter or unnecessary equipment/Purposeful proactive rounding/Room/bathroom lighting operational, light cord in reach

## 2024-02-25 NOTE — ED ADULT TRIAGE NOTE - NS ED NURSE BANDS TYPE
Patient being transported to [facility] via wheelchair        Florina Davis RN  03/04/21 [id number]
Name band;

## 2024-02-25 NOTE — ED PROVIDER NOTE - NSFOLLOWUPINSTRUCTIONS_ED_ALL_ED_FT
Follow up with the primary care doctor in -3 days.  Take Naproxen 500 mg orally twice a day for 5 days. Take medication with food.  If you experience any new or worsening symptoms or if you are concerned you can always come back to the emergency for a re-evaluation.  Some results may not be available at the time of your discharge from the hospital. You can download the FOLLOW MY HEALTH jeff and have access to these results.

## 2024-02-25 NOTE — ED PROVIDER NOTE - PATIENT PORTAL LINK FT
You can access the FollowMyHealth Patient Portal offered by [facility] by registering at the following website: [email or web address]. By joining [facility]’s FollowMyHealth portal, you will also be able to view your health information using other applications (apps) compatible with our system.

## 2024-02-25 NOTE — ED PROVIDER NOTE - OBJECTIVE STATEMENT
23 year old female PMH bipolar coming in with 3 days of initially mid abdominal pain and now RLQ abd pain with intermittent diarrhea. states pain worsening so came to the ED for eval. denies all other complaints at this time.

## 2024-02-25 NOTE — ED PROVIDER NOTE - CLINICAL SUMMARY MEDICAL DECISION MAKING FREE TEXT BOX
23 year old female with RLQ abd pain. PE as above.  labs, UA, pain control, ct abdo/pelvis, reassess

## 2024-02-25 NOTE — ED PROVIDER NOTE - PROGRESS NOTE DETAILS
pain improved. labs with mild elevation of wbc, otherwise unremarkable. ua negative for uti. pending ct abdo/pelvis. Signout from Dr. Beck pending CT results.  CT shows right lower quadrant enlarged lymph nodes and mild terminal ileitis.  Patient reports having diarrhea for 2 days.  Appendix appears within normal limits.  No abnormal ovarian enlargement.   patient well-appearing, nontoxic.  Has PMD and can follow-up in 2-3 days.  Discussed red flags to come back to the hospital.  Advise NSAID at home to improve symptoms and decrease the inflammation. Pt is well appearing walking with steady gait, stable for discharge and follow up without fail with medical doctor. I had a detailed discussion with the patient and/or guardian regarding the historical points, exam findings, and any diagnostic results supporting the discharge diagnosis. Pt educated on care and need for follow up. Strict return instructions and red flag signs and symptoms discussed with patient. Questions answered. Pt shows understanding of discharge information and agrees to follow.

## 2024-02-26 LAB
CULTURE RESULTS: SIGNIFICANT CHANGE UP
SPECIMEN SOURCE: SIGNIFICANT CHANGE UP

## 2024-09-28 NOTE — BH SOCIAL WORK INITIAL PSYCHOSOCIAL EVALUATION - NSBHSACONSEQUENCE_PSY_A_CORE FT
MRI Contrast Discharge Instructions    The IV contrast you received today will pass out of your body in your  urine. This will happen in the next 24 hours. You will not feel this process.  Your urine will not change color.    Drink at least 4 extra glasses of water or juice today (unless your doctor  has restricted your fluids). This reduces the stress on your kidneys.  You may take your regular medicines.    If you are on dialysis: It is best to have dialysis today.    If you have a reaction: Most reactions happen right away. If you have  any new symptoms after leaving the hospital (such as hives or swelling),  call your hospital at the correct number below. Or call your family doctor.  If you have breathing distress or wheezing, call 911.    Special instructions: ***    I have read and understand the above information.    Signature:______________________________________ Date:___________    Staff:__________________________________________ Date:___________     Time:__________    [facility] Departments:    ___[place]: [phone]  ___[facility]: [phone]  ___[place]: [phone] ___[facility]: [phone]  ___[facility]: [phone]  ___[facility]: [phone]  ___[phone]  ___[facility]: [phone]  ___[phone]  
Denies

## 2025-06-08 NOTE — BH INPATIENT PSYCHIATRY PROGRESS NOTE - NSBHMETABOLIC_PSY_ALL_CORE_FT
4 = No assist / stand by assistance BMI: BMI (kg/m2): 22.7 (02-09-24 @ 20:37)  HbA1c:   Glucose:   BP: --Vital Signs Last 24 Hrs  T(C): 36.9 (02-10-24 @ 20:29), Max: 36.9 (02-10-24 @ 20:29)  T(F): 98.5 (02-10-24 @ 20:29), Max: 98.5 (02-10-24 @ 20:29)  HR: --  BP: --  BP(mean): --  RR: 16 (02-10-24 @ 08:17) (16 - 16)  SpO2: --    Orthostatic VS  02-10-24 @ 08:17  Lying BP: --/-- HR: --  Sitting BP: 112/67 HR: 87  Standing BP: 106/69 HR: 112  Site: --  Mode: --  Orthostatic VS  02-09-24 @ 20:37  Lying BP: --/-- HR: --  Sitting BP: 87/67 HR: 74  Standing BP: 95/65 HR: 89  Site: --  Mode: --    Lipid Panel:  BMI: BMI (kg/m2): 22.7 (02-09-24 @ 20:37)  HbA1c:   Glucose:   BP: --Vital Signs Last 24 Hrs  T(C): 37.1 (02-11-24 @ 08:36), Max: 37.1 (02-11-24 @ 08:36)  T(F): 98.8 (02-11-24 @ 08:36), Max: 98.8 (02-11-24 @ 08:36)  HR: --  BP: --  BP(mean): --  RR: 17 (02-11-24 @ 08:36) (17 - 17)  SpO2: --    Orthostatic VS  02-11-24 @ 08:36  Lying BP: --/-- HR: --  Sitting BP: 119/62 HR: 84  Standing BP: 106/57 HR: 100  Site: upper right arm  Mode: electronic  Orthostatic VS  02-10-24 @ 08:17  Lying BP: --/-- HR: --  Sitting BP: 112/67 HR: 87  Standing BP: 106/69 HR: 112  Site: --  Mode: --  Orthostatic VS  02-09-24 @ 20:37  Lying BP: --/-- HR: --  Sitting BP: 87/67 HR: 74  Standing BP: 95/65 HR: 89  Site: --  Mode: --    Lipid Panel: